# Patient Record
Sex: FEMALE | Race: BLACK OR AFRICAN AMERICAN | NOT HISPANIC OR LATINO
[De-identification: names, ages, dates, MRNs, and addresses within clinical notes are randomized per-mention and may not be internally consistent; named-entity substitution may affect disease eponyms.]

---

## 2018-03-22 ENCOUNTER — APPOINTMENT (OUTPATIENT)
Dept: ULTRASOUND IMAGING | Facility: IMAGING CENTER | Age: 28
End: 2018-03-22
Payer: MEDICAID

## 2018-03-22 ENCOUNTER — OUTPATIENT (OUTPATIENT)
Dept: OUTPATIENT SERVICES | Facility: HOSPITAL | Age: 28
LOS: 1 days | End: 2018-03-22
Payer: COMMERCIAL

## 2018-03-22 DIAGNOSIS — Z00.8 ENCOUNTER FOR OTHER GENERAL EXAMINATION: ICD-10-CM

## 2018-03-22 PROCEDURE — 76856 US EXAM PELVIC COMPLETE: CPT

## 2018-03-22 PROCEDURE — 76700 US EXAM ABDOM COMPLETE: CPT

## 2018-03-22 PROCEDURE — 76856 US EXAM PELVIC COMPLETE: CPT | Mod: 26

## 2018-03-22 PROCEDURE — 76830 TRANSVAGINAL US NON-OB: CPT | Mod: 26

## 2018-03-22 PROCEDURE — 76700 US EXAM ABDOM COMPLETE: CPT | Mod: 26

## 2018-03-22 PROCEDURE — 76830 TRANSVAGINAL US NON-OB: CPT

## 2018-09-06 ENCOUNTER — RESULT REVIEW (OUTPATIENT)
Age: 28
End: 2018-09-06

## 2018-09-06 ENCOUNTER — LABORATORY RESULT (OUTPATIENT)
Age: 28
End: 2018-09-06

## 2018-09-06 ENCOUNTER — OUTPATIENT (OUTPATIENT)
Dept: OUTPATIENT SERVICES | Facility: HOSPITAL | Age: 28
LOS: 1 days | End: 2018-09-06

## 2018-09-06 ENCOUNTER — APPOINTMENT (OUTPATIENT)
Dept: OBGYN | Facility: HOSPITAL | Age: 28
End: 2018-09-06
Payer: MEDICAID

## 2018-09-06 VITALS
BODY MASS INDEX: 40.57 KG/M2 | HEART RATE: 109 BPM | HEIGHT: 62 IN | WEIGHT: 220.46 LBS | DIASTOLIC BLOOD PRESSURE: 65 MMHG | SYSTOLIC BLOOD PRESSURE: 119 MMHG

## 2018-09-06 DIAGNOSIS — Z87.42 PERSONAL HISTORY OF OTHER DISEASES OF THE FEMALE GENITAL TRACT: ICD-10-CM

## 2018-09-06 DIAGNOSIS — Z78.9 OTHER SPECIFIED HEALTH STATUS: ICD-10-CM

## 2018-09-06 LAB — HCG SERPL-ACNC: 1347 MIU/ML — SIGNIFICANT CHANGE UP

## 2018-09-06 PROCEDURE — 81025 URINE PREGNANCY TEST: CPT | Mod: NC

## 2018-09-06 PROCEDURE — 99213 OFFICE O/P EST LOW 20 MIN: CPT | Mod: GE

## 2018-09-07 ENCOUNTER — MESSAGE (OUTPATIENT)
Age: 28
End: 2018-09-07

## 2018-09-07 DIAGNOSIS — Z32.01 ENCOUNTER FOR PREGNANCY TEST, RESULT POSITIVE: ICD-10-CM

## 2018-09-07 DIAGNOSIS — Z78.9 OTHER SPECIFIED HEALTH STATUS: ICD-10-CM

## 2018-09-07 DIAGNOSIS — Z87.42 PERSONAL HISTORY OF OTHER DISEASES OF THE FEMALE GENITAL TRACT: ICD-10-CM

## 2018-09-07 LAB — HPV HIGH+LOW RISK DNA PNL CVX: SIGNIFICANT CHANGE UP

## 2018-09-10 ENCOUNTER — LABORATORY RESULT (OUTPATIENT)
Age: 28
End: 2018-09-10

## 2018-09-10 ENCOUNTER — APPOINTMENT (OUTPATIENT)
Dept: OBGYN | Facility: HOSPITAL | Age: 28
End: 2018-09-10

## 2018-09-10 ENCOUNTER — OUTPATIENT (OUTPATIENT)
Dept: OUTPATIENT SERVICES | Facility: HOSPITAL | Age: 28
LOS: 1 days | End: 2018-09-10
Payer: MEDICAID

## 2018-09-10 LAB
CYTOLOGY SPEC DOC CYTO: SIGNIFICANT CHANGE UP
HCG SERPL-ACNC: 4907 MIU/ML — SIGNIFICANT CHANGE UP

## 2018-09-11 DIAGNOSIS — Z00.00 ENCOUNTER FOR GENERAL ADULT MEDICAL EXAMINATION WITHOUT ABNORMAL FINDINGS: ICD-10-CM

## 2018-09-11 LAB — HCG UR QL: POSITIVE

## 2018-09-12 LAB — CYTOLOGY CVX/VAG DOC THIN PREP: NORMAL

## 2018-09-13 ENCOUNTER — APPOINTMENT (OUTPATIENT)
Dept: ULTRASOUND IMAGING | Facility: HOSPITAL | Age: 28
End: 2018-09-13

## 2018-09-13 PROCEDURE — 76801 OB US < 14 WKS SINGLE FETUS: CPT | Mod: 26

## 2018-09-24 ENCOUNTER — EMERGENCY (EMERGENCY)
Facility: HOSPITAL | Age: 28
LOS: 1 days | Discharge: HOME | End: 2018-09-24
Attending: EMERGENCY MEDICINE | Admitting: EMERGENCY MEDICINE
Payer: MEDICAID

## 2018-09-24 VITALS
OXYGEN SATURATION: 100 % | RESPIRATION RATE: 18 BRPM | TEMPERATURE: 98 F | HEART RATE: 95 BPM | DIASTOLIC BLOOD PRESSURE: 64 MMHG | SYSTOLIC BLOOD PRESSURE: 109 MMHG

## 2018-09-24 LAB
APPEARANCE UR: CLEAR — SIGNIFICANT CHANGE UP
BACTERIA # UR AUTO: NEGATIVE — SIGNIFICANT CHANGE UP
BILIRUB UR-MCNC: NEGATIVE — SIGNIFICANT CHANGE UP
BLD GP AB SCN SERPL QL: NEGATIVE — SIGNIFICANT CHANGE UP
BLOOD UR QL VISUAL: SIGNIFICANT CHANGE UP
COLOR SPEC: YELLOW — SIGNIFICANT CHANGE UP
GLUCOSE UR-MCNC: NEGATIVE — SIGNIFICANT CHANGE UP
HCG SERPL-ACNC: SIGNIFICANT CHANGE UP MIU/ML
HYALINE CASTS # UR AUTO: NEGATIVE — SIGNIFICANT CHANGE UP
KETONES UR-MCNC: NEGATIVE — SIGNIFICANT CHANGE UP
LEUKOCYTE ESTERASE UR-ACNC: NEGATIVE — SIGNIFICANT CHANGE UP
NITRITE UR-MCNC: NEGATIVE — SIGNIFICANT CHANGE UP
PH UR: 7 — SIGNIFICANT CHANGE UP (ref 5–8)
PROT UR-MCNC: 10 — SIGNIFICANT CHANGE UP
RBC CASTS # UR COMP ASSIST: SIGNIFICANT CHANGE UP (ref 0–?)
RH IG SCN BLD-IMP: POSITIVE — SIGNIFICANT CHANGE UP
SP GR SPEC: 1.02 — SIGNIFICANT CHANGE UP (ref 1–1.04)
SQUAMOUS # UR AUTO: SIGNIFICANT CHANGE UP
UROBILINOGEN FLD QL: NORMAL — SIGNIFICANT CHANGE UP
WBC UR QL: SIGNIFICANT CHANGE UP (ref 0–?)

## 2018-09-24 PROCEDURE — 99284 EMERGENCY DEPT VISIT MOD MDM: CPT

## 2018-09-24 PROCEDURE — 76830 TRANSVAGINAL US NON-OB: CPT | Mod: 26

## 2018-09-24 NOTE — ED PROVIDER NOTE - MEDICAL DECISION MAKING DETAILS
suprapubic cramping and vaginal spotting. ~ 7-8 weeks pregnant. suprapubic cramping and vaginal spotting. ~ 7-8 weeks pregnant. RH +. US confirming IUP at 7 weeks. on patient re-eval she has no complaints. abd soft and NT. VS stable. eating/drinking without complaints. Patient was discharged with instructions to drink fluids , and follow up with obgyn in 1-2 days for repeat evaluation and further management.    The patient was discharged from the ED in stable condition. All results of today's workup were discussed with the patient and all questions/concerns were addressed. All discharge instructions were thoroughly discussed with the patient, as well as important warning signs and new/ worsening symptoms which should necessitate patient's immediate return to the ED. The patient is agreeable with discharge and expresses full understanding of all instructions given.

## 2018-09-24 NOTE — ED ADULT NURSE NOTE - OBJECTIVE STATEMENT
A&Ox3, respirations even and unlabored, skin warm and dry good for color, ambulatory with steady gait. Patient reports vaginal spotting and went to clinic and was directed to ER. Patient also reports lower abdominal cramping. Left hand 22g IV placed, labs drawn and sent.

## 2018-09-24 NOTE — ED PROVIDER NOTE - OBJECTIVE STATEMENT
27 yo F  approximately 8 weeks pregnant, unknown LMP, presents with suprapubic cramping and vaginal spotting since yesterday. mild cramping at this time. no vaginal discharge. had an US 2 weeks ago that just showed gestational sac with no fetal pole.   no f/ch, N/V/D, urinary complaints 27 yo F  approximately 7-8 weeks pregnant, unknown LMP, presents with suprapubic cramping and vaginal spotting since yesterday. mild cramping at this time. no vaginal discharge. had an US 2 weeks ago that just showed gestational sac with no fetal pole.   no f/ch, N/V/D, urinary complaints

## 2018-09-24 NOTE — ED PROVIDER NOTE - PMH
----- Message from Daniel Galeano MD sent at 4/6/2017  9:11 AM CDT -----  Regarding: RE:  I am fine with that but am not around today, is there someone who can prescribe her 20 norcos?  ----- Message -----     From: Marissa Cano LPN     Sent: 4/6/2017   8:59 AM       To: Daniel Galeano MD    Patient called still having pain at level 8 in her left side (flank area)  She saw you last week and you recommended her for an appt with pain  Well that is weeks out and she needs meds till then??? marissa   No pertinent past medical history

## 2018-09-24 NOTE — ED ADULT NURSE NOTE - NSIMPLEMENTINTERV_GEN_ALL_ED
Implemented All Universal Safety Interventions:  [place] to call system. Call bell, personal items and telephone within reach. Instruct patient to call for assistance. Room bathroom lighting operational. Non-slip footwear when patient is off stretcher. Physically safe environment: no spills, clutter or unnecessary equipment. Stretcher in lowest position, wheels locked, appropriate side rails in place.

## 2018-09-26 LAB
BACTERIA UR CULT: SIGNIFICANT CHANGE UP
SPECIMEN SOURCE: SIGNIFICANT CHANGE UP

## 2018-09-27 ENCOUNTER — APPOINTMENT (OUTPATIENT)
Dept: ULTRASOUND IMAGING | Facility: HOSPITAL | Age: 28
End: 2018-09-27

## 2018-09-28 NOTE — ED POST DISCHARGE NOTE - RESULT SUMMARY
UCX: (+) contamination. Pt currently pregnant. I spoke to her. Denies dysuria, burning, frequency. I advised her to have urine culture repeated with PMD or OBGYN. Pt understands and agrees. Strict return precautions were discussed.

## 2018-10-09 ENCOUNTER — LABORATORY RESULT (OUTPATIENT)
Age: 28
End: 2018-10-09

## 2018-10-09 ENCOUNTER — APPOINTMENT (OUTPATIENT)
Dept: OBGYN | Facility: HOSPITAL | Age: 28
End: 2018-10-09

## 2018-10-09 ENCOUNTER — OUTPATIENT (OUTPATIENT)
Dept: OUTPATIENT SERVICES | Facility: HOSPITAL | Age: 28
LOS: 1 days | End: 2018-10-09
Payer: MEDICAID

## 2018-10-09 ENCOUNTER — NON-APPOINTMENT (OUTPATIENT)
Age: 28
End: 2018-10-09

## 2018-10-09 VITALS
DIASTOLIC BLOOD PRESSURE: 81 MMHG | HEART RATE: 96 BPM | WEIGHT: 227.73 LBS | SYSTOLIC BLOOD PRESSURE: 112 MMHG | BODY MASS INDEX: 41.65 KG/M2

## 2018-10-09 DIAGNOSIS — Z87.898 PERSONAL HISTORY OF OTHER SPECIFIED CONDITIONS: ICD-10-CM

## 2018-10-09 DIAGNOSIS — Z34.90 ENCOUNTER FOR SUPERVISION OF NORMAL PREGNANCY, UNSPECIFIED, UNSPECIFIED TRIMESTER: ICD-10-CM

## 2018-10-09 DIAGNOSIS — Z82.49 FAMILY HISTORY OF ISCHEMIC HEART DISEASE AND OTHER DISEASES OF THE CIRCULATORY SYSTEM: ICD-10-CM

## 2018-10-09 DIAGNOSIS — Z82.0 FAMILY HISTORY OF EPILEPSY AND OTHER DISEASES OF THE NERVOUS SYSTEM: ICD-10-CM

## 2018-10-09 LAB
ALBUMIN SERPL ELPH-MCNC: 4.2 G/DL — SIGNIFICANT CHANGE UP (ref 3.3–5)
ALP SERPL-CCNC: 65 U/L — SIGNIFICANT CHANGE UP (ref 40–120)
ALT FLD-CCNC: 18 U/L — SIGNIFICANT CHANGE UP (ref 4–33)
APPEARANCE UR: CLEAR — SIGNIFICANT CHANGE UP
AST SERPL-CCNC: 14 U/L — SIGNIFICANT CHANGE UP (ref 4–32)
BACTERIA # UR AUTO: NEGATIVE — SIGNIFICANT CHANGE UP
BASOPHILS # BLD AUTO: 0.03 K/UL — SIGNIFICANT CHANGE UP (ref 0–0.2)
BASOPHILS NFR BLD AUTO: 0.3 % — SIGNIFICANT CHANGE UP (ref 0–2)
BILIRUB SERPL-MCNC: 0.3 MG/DL — SIGNIFICANT CHANGE UP (ref 0.2–1.2)
BILIRUB UR-MCNC: NEGATIVE — SIGNIFICANT CHANGE UP
BLD GP AB SCN SERPL QL: NEGATIVE — SIGNIFICANT CHANGE UP
BLOOD UR QL VISUAL: NEGATIVE — SIGNIFICANT CHANGE UP
BUN SERPL-MCNC: 5 MG/DL — LOW (ref 7–23)
CALCIUM SERPL-MCNC: 9.4 MG/DL — SIGNIFICANT CHANGE UP (ref 8.4–10.5)
CHLORIDE SERPL-SCNC: 99 MMOL/L — SIGNIFICANT CHANGE UP (ref 98–107)
CO2 SERPL-SCNC: 25 MMOL/L — SIGNIFICANT CHANGE UP (ref 22–31)
COLOR SPEC: YELLOW — SIGNIFICANT CHANGE UP
CREAT SERPL-MCNC: 0.68 MG/DL — SIGNIFICANT CHANGE UP (ref 0.5–1.3)
EOSINOPHIL # BLD AUTO: 0.04 K/UL — SIGNIFICANT CHANGE UP (ref 0–0.5)
EOSINOPHIL NFR BLD AUTO: 0.4 % — SIGNIFICANT CHANGE UP (ref 0–6)
GLUCOSE SERPL-MCNC: 81 MG/DL — SIGNIFICANT CHANGE UP (ref 70–99)
GLUCOSE UR-MCNC: NEGATIVE — SIGNIFICANT CHANGE UP
HBA1C BLD-MCNC: 4.7 % — SIGNIFICANT CHANGE UP (ref 4–5.6)
HBV SURFACE AG SER-ACNC: NONREACTIVE — SIGNIFICANT CHANGE UP
HCT VFR BLD CALC: 36.9 % — SIGNIFICANT CHANGE UP (ref 34.5–45)
HCV AB S/CO SERPL IA: 0.1 S/CO — SIGNIFICANT CHANGE UP
HCV AB SERPL-IMP: SIGNIFICANT CHANGE UP
HGB BLD-MCNC: 12.2 G/DL — SIGNIFICANT CHANGE UP (ref 11.5–15.5)
HIV 1+2 AB+HIV1 P24 AG SERPL QL IA: SIGNIFICANT CHANGE UP
HYALINE CASTS # UR AUTO: SIGNIFICANT CHANGE UP
IMM GRANULOCYTES # BLD AUTO: 0.05 # — SIGNIFICANT CHANGE UP
IMM GRANULOCYTES NFR BLD AUTO: 0.5 % — SIGNIFICANT CHANGE UP (ref 0–1.5)
KETONES UR-MCNC: NEGATIVE — SIGNIFICANT CHANGE UP
LEUKOCYTE ESTERASE UR-ACNC: NEGATIVE — SIGNIFICANT CHANGE UP
LYMPHOCYTES # BLD AUTO: 2.47 K/UL — SIGNIFICANT CHANGE UP (ref 1–3.3)
LYMPHOCYTES # BLD AUTO: 24.4 % — SIGNIFICANT CHANGE UP (ref 13–44)
MCHC RBC-ENTMCNC: 31.1 PG — SIGNIFICANT CHANGE UP (ref 27–34)
MCHC RBC-ENTMCNC: 33.1 % — SIGNIFICANT CHANGE UP (ref 32–36)
MCV RBC AUTO: 94.1 FL — SIGNIFICANT CHANGE UP (ref 80–100)
MONOCYTES # BLD AUTO: 0.54 K/UL — SIGNIFICANT CHANGE UP (ref 0–0.9)
MONOCYTES NFR BLD AUTO: 5.3 % — SIGNIFICANT CHANGE UP (ref 2–14)
NEUTROPHILS # BLD AUTO: 6.99 K/UL — SIGNIFICANT CHANGE UP (ref 1.8–7.4)
NEUTROPHILS NFR BLD AUTO: 69.1 % — SIGNIFICANT CHANGE UP (ref 43–77)
NITRITE UR-MCNC: NEGATIVE — SIGNIFICANT CHANGE UP
NRBC # FLD: 0 — SIGNIFICANT CHANGE UP
PH UR: 6 — SIGNIFICANT CHANGE UP (ref 5–8)
PLATELET # BLD AUTO: 310 K/UL — SIGNIFICANT CHANGE UP (ref 150–400)
PMV BLD: 9.6 FL — SIGNIFICANT CHANGE UP (ref 7–13)
POTASSIUM SERPL-MCNC: 3.8 MMOL/L — SIGNIFICANT CHANGE UP (ref 3.5–5.3)
POTASSIUM SERPL-SCNC: 3.8 MMOL/L — SIGNIFICANT CHANGE UP (ref 3.5–5.3)
PROT SERPL-MCNC: 7.4 G/DL — SIGNIFICANT CHANGE UP (ref 6–8.3)
PROT UR-MCNC: 30 — SIGNIFICANT CHANGE UP
RBC # BLD: 3.92 M/UL — SIGNIFICANT CHANGE UP (ref 3.8–5.2)
RBC # FLD: 12.9 % — SIGNIFICANT CHANGE UP (ref 10.3–14.5)
RBC CASTS # UR COMP ASSIST: SIGNIFICANT CHANGE UP (ref 0–?)
RH IG SCN BLD-IMP: POSITIVE — SIGNIFICANT CHANGE UP
RUBV IGG SER-ACNC: 4.2 INDEX — SIGNIFICANT CHANGE UP
RUBV IGG SER-IMP: POSITIVE — SIGNIFICANT CHANGE UP
SODIUM SERPL-SCNC: 135 MMOL/L — SIGNIFICANT CHANGE UP (ref 135–145)
SP GR SPEC: 1.03 — SIGNIFICANT CHANGE UP (ref 1–1.04)
SQUAMOUS # UR AUTO: SIGNIFICANT CHANGE UP
T PALLIDUM AB TITR SER: NEGATIVE — SIGNIFICANT CHANGE UP
URATE SERPL-MCNC: 3.1 MG/DL — SIGNIFICANT CHANGE UP (ref 2.5–7)
UROBILINOGEN FLD QL: NORMAL — SIGNIFICANT CHANGE UP
VZV IGG FLD QL IA: 65.4 INDEX — SIGNIFICANT CHANGE UP
VZV IGG FLD QL IA: NEGATIVE — SIGNIFICANT CHANGE UP
WBC # BLD: 10.12 K/UL — SIGNIFICANT CHANGE UP (ref 3.8–10.5)
WBC # FLD AUTO: 10.12 K/UL — SIGNIFICANT CHANGE UP (ref 3.8–10.5)
WBC UR QL: SIGNIFICANT CHANGE UP (ref 0–?)

## 2018-10-09 PROCEDURE — G0452: CPT

## 2018-10-10 LAB
C TRACH RRNA SPEC QL NAA+PROBE: SIGNIFICANT CHANGE UP
GAMMA INTERFERON BACKGROUND BLD IA-ACNC: 0.45 IU/ML — SIGNIFICANT CHANGE UP
HGB A MFR BLD: 96.5 % — SIGNIFICANT CHANGE UP
HGB A2 MFR BLD: 3.2 % — SIGNIFICANT CHANGE UP (ref 2.4–3.5)
HGB ELECT COMMENT: SIGNIFICANT CHANGE UP
HGB F MFR BLD: < 1 % — SIGNIFICANT CHANGE UP (ref 0–1.5)
LEAD SERPL-MCNC: 1 UG/DL — SIGNIFICANT CHANGE UP (ref 0–4)
M TB IFN-G BLD-IMP: NEGATIVE — SIGNIFICANT CHANGE UP
M TB IFN-G CD4+ BCKGRND COR BLD-ACNC: -0.19 IU/ML — SIGNIFICANT CHANGE UP
M TB IFN-G CD4+CD8+ BCKGRND COR BLD-ACNC: -0.16 IU/ML — SIGNIFICANT CHANGE UP
N GONORRHOEA RRNA SPEC QL NAA+PROBE: SIGNIFICANT CHANGE UP
QUANT TB PLUS MITOGEN MINUS NIL: 7.73 IU/ML — SIGNIFICANT CHANGE UP
SPECIMEN SOURCE: SIGNIFICANT CHANGE UP

## 2018-10-11 LAB
BACTERIA UR CULT: SIGNIFICANT CHANGE UP
SPECIMEN SOURCE: SIGNIFICANT CHANGE UP

## 2018-10-14 LAB — CFTR MUT ANL BLD/T: NEGATIVE — SIGNIFICANT CHANGE UP

## 2018-10-22 ENCOUNTER — OUTPATIENT (OUTPATIENT)
Dept: OUTPATIENT SERVICES | Facility: HOSPITAL | Age: 28
LOS: 1 days | End: 2018-10-22

## 2018-10-22 ENCOUNTER — NON-APPOINTMENT (OUTPATIENT)
Age: 28
End: 2018-10-22

## 2018-10-22 ENCOUNTER — APPOINTMENT (OUTPATIENT)
Dept: OBGYN | Facility: HOSPITAL | Age: 28
End: 2018-10-22

## 2018-10-22 VITALS
HEART RATE: 90 BPM | BODY MASS INDEX: 41.88 KG/M2 | SYSTOLIC BLOOD PRESSURE: 128 MMHG | DIASTOLIC BLOOD PRESSURE: 75 MMHG | WEIGHT: 229 LBS

## 2018-10-23 DIAGNOSIS — Z34.90 ENCOUNTER FOR SUPERVISION OF NORMAL PREGNANCY, UNSPECIFIED, UNSPECIFIED TRIMESTER: ICD-10-CM

## 2018-11-07 ENCOUNTER — APPOINTMENT (OUTPATIENT)
Dept: ANTEPARTUM | Facility: CLINIC | Age: 28
End: 2018-11-07
Payer: MEDICAID

## 2018-11-07 ENCOUNTER — ASOB RESULT (OUTPATIENT)
Age: 28
End: 2018-11-07

## 2018-11-07 ENCOUNTER — LABORATORY RESULT (OUTPATIENT)
Age: 28
End: 2018-11-07

## 2018-11-07 PROCEDURE — 76813 OB US NUCHAL MEAS 1 GEST: CPT | Mod: 26

## 2018-11-12 ENCOUNTER — APPOINTMENT (OUTPATIENT)
Dept: OBGYN | Facility: HOSPITAL | Age: 28
End: 2018-11-12

## 2018-11-12 LAB
1ST TRIMESTER DATA: SIGNIFICANT CHANGE UP
ADDENDUM DOC: SIGNIFICANT CHANGE UP
AFP SERPL-ACNC: SIGNIFICANT CHANGE UP
B-HCG FREE SERPL-MCNC: SIGNIFICANT CHANGE UP
CLINICAL BIOCHEMIST REVIEW: SIGNIFICANT CHANGE UP
CLINICAL BIOCHEMIST REVIEW: SIGNIFICANT CHANGE UP
DEMOGRAPHIC DATA: SIGNIFICANT CHANGE UP
NT: SIGNIFICANT CHANGE UP
PAPP-A SERPL-ACNC: SIGNIFICANT CHANGE UP
SCREEN-FOOTER: SIGNIFICANT CHANGE UP
SCREEN-RECOMMENDATIONS: SIGNIFICANT CHANGE UP

## 2018-11-19 ENCOUNTER — OUTPATIENT (OUTPATIENT)
Dept: OUTPATIENT SERVICES | Facility: HOSPITAL | Age: 28
LOS: 1 days | End: 2018-11-19
Payer: MEDICAID

## 2018-11-19 ENCOUNTER — LABORATORY RESULT (OUTPATIENT)
Age: 28
End: 2018-11-19

## 2018-11-19 ENCOUNTER — APPOINTMENT (OUTPATIENT)
Dept: OBGYN | Facility: HOSPITAL | Age: 28
End: 2018-11-19
Payer: MEDICAID

## 2018-11-19 VITALS
BODY MASS INDEX: 42.51 KG/M2 | WEIGHT: 231 LBS | HEART RATE: 97 BPM | HEIGHT: 62 IN | SYSTOLIC BLOOD PRESSURE: 122 MMHG | DIASTOLIC BLOOD PRESSURE: 62 MMHG

## 2018-11-19 DIAGNOSIS — Z34.92 ENCOUNTER FOR SUPERVISION OF NORMAL PREGNANCY, UNSPECIFIED, SECOND TRIMESTER: ICD-10-CM

## 2018-11-19 LAB — GLUCOSE BLDC GLUCOMTR-MCNC: 100

## 2018-11-19 PROCEDURE — 0502F SUBSEQUENT PRENATAL CARE: CPT | Mod: NC,GC

## 2018-11-20 ENCOUNTER — NON-APPOINTMENT (OUTPATIENT)
Age: 28
End: 2018-11-20

## 2018-11-20 LAB — AFP-TM SERPL-MCNC: 27.5 NG/ML — HIGH

## 2018-11-23 ENCOUNTER — NON-APPOINTMENT (OUTPATIENT)
Age: 28
End: 2018-11-23

## 2018-12-18 ENCOUNTER — APPOINTMENT (OUTPATIENT)
Dept: ULTRASOUND IMAGING | Facility: HOSPITAL | Age: 28
End: 2018-12-18

## 2018-12-18 PROCEDURE — 76805 OB US >/= 14 WKS SNGL FETUS: CPT | Mod: 26

## 2019-01-02 ENCOUNTER — OUTPATIENT (OUTPATIENT)
Dept: OUTPATIENT SERVICES | Facility: HOSPITAL | Age: 29
LOS: 1 days | End: 2019-01-02

## 2019-01-02 ENCOUNTER — APPOINTMENT (OUTPATIENT)
Dept: OBGYN | Facility: HOSPITAL | Age: 29
End: 2019-01-02

## 2019-01-02 ENCOUNTER — NON-APPOINTMENT (OUTPATIENT)
Age: 29
End: 2019-01-02

## 2019-01-02 ENCOUNTER — LABORATORY RESULT (OUTPATIENT)
Age: 29
End: 2019-01-02

## 2019-01-02 VITALS
SYSTOLIC BLOOD PRESSURE: 124 MMHG | BODY MASS INDEX: 43.06 KG/M2 | WEIGHT: 234 LBS | HEIGHT: 62 IN | DIASTOLIC BLOOD PRESSURE: 70 MMHG | HEART RATE: 100 BPM

## 2019-01-03 DIAGNOSIS — Z34.92 ENCOUNTER FOR SUPERVISION OF NORMAL PREGNANCY, UNSPECIFIED, SECOND TRIMESTER: ICD-10-CM

## 2019-01-04 LAB
1ST TRIMESTER DATA: SIGNIFICANT CHANGE UP
2ND TRIMESTER DATA: SIGNIFICANT CHANGE UP
AFP SERPL-ACNC: SIGNIFICANT CHANGE UP
AFP SERPL-ACNC: SIGNIFICANT CHANGE UP
B-HCG FREE SERPL-MCNC: SIGNIFICANT CHANGE UP
B-HCG FREE SERPL-MCNC: SIGNIFICANT CHANGE UP
CLINICAL BIOCHEMIST REVIEW: SIGNIFICANT CHANGE UP
DEMOGRAPHIC DATA: SIGNIFICANT CHANGE UP
INHIBIN A SERPL-MCNC: SIGNIFICANT CHANGE UP
NT: SIGNIFICANT CHANGE UP
PAPP-A SERPL-ACNC: SIGNIFICANT CHANGE UP
SCREEN-FOOTER: SIGNIFICANT CHANGE UP
U ESTRIOL SERPL-SCNC: SIGNIFICANT CHANGE UP

## 2019-01-31 ENCOUNTER — OUTPATIENT (OUTPATIENT)
Dept: OUTPATIENT SERVICES | Facility: HOSPITAL | Age: 29
LOS: 1 days | End: 2019-01-31
Payer: MEDICAID

## 2019-01-31 ENCOUNTER — APPOINTMENT (OUTPATIENT)
Dept: OBGYN | Facility: HOSPITAL | Age: 29
End: 2019-01-31

## 2019-01-31 ENCOUNTER — NON-APPOINTMENT (OUTPATIENT)
Age: 29
End: 2019-01-31

## 2019-01-31 ENCOUNTER — OUTPATIENT (OUTPATIENT)
Dept: OUTPATIENT SERVICES | Facility: HOSPITAL | Age: 29
LOS: 1 days | End: 2019-01-31

## 2019-01-31 VITALS
SYSTOLIC BLOOD PRESSURE: 119 MMHG | BODY MASS INDEX: 45.15 KG/M2 | HEIGHT: 62 IN | WEIGHT: 245.37 LBS | HEART RATE: 120 BPM | DIASTOLIC BLOOD PRESSURE: 79 MMHG

## 2019-01-31 VITALS — DIASTOLIC BLOOD PRESSURE: 61 MMHG | HEART RATE: 116 BPM | SYSTOLIC BLOOD PRESSURE: 128 MMHG

## 2019-01-31 DIAGNOSIS — Z3A.00 WEEKS OF GESTATION OF PREGNANCY NOT SPECIFIED: ICD-10-CM

## 2019-01-31 DIAGNOSIS — O26.899 OTHER SPECIFIED PREGNANCY RELATED CONDITIONS, UNSPECIFIED TRIMESTER: ICD-10-CM

## 2019-01-31 PROCEDURE — 76815 OB US LIMITED FETUS(S): CPT | Mod: 26

## 2019-01-31 PROCEDURE — 99213 OFFICE O/P EST LOW 20 MIN: CPT | Mod: 25

## 2019-02-01 ENCOUNTER — LABORATORY RESULT (OUTPATIENT)
Age: 29
End: 2019-02-01

## 2019-02-01 ENCOUNTER — APPOINTMENT (OUTPATIENT)
Dept: OBGYN | Facility: HOSPITAL | Age: 29
End: 2019-02-01

## 2019-02-01 ENCOUNTER — OUTPATIENT (OUTPATIENT)
Dept: OUTPATIENT SERVICES | Facility: HOSPITAL | Age: 29
LOS: 1 days | End: 2019-02-01

## 2019-02-01 VITALS
WEIGHT: 240 LBS | HEIGHT: 62 IN | DIASTOLIC BLOOD PRESSURE: 59 MMHG | SYSTOLIC BLOOD PRESSURE: 121 MMHG | HEART RATE: 116 BPM | BODY MASS INDEX: 44.16 KG/M2

## 2019-02-01 DIAGNOSIS — Z34.92 ENCOUNTER FOR SUPERVISION OF NORMAL PREGNANCY, UNSPECIFIED, SECOND TRIMESTER: ICD-10-CM

## 2019-02-01 LAB — GLUCOSE 1H P CHAL SERPL-SCNC: 143 MG/DL — SIGNIFICANT CHANGE UP (ref 120–170)

## 2019-02-04 ENCOUNTER — CHART COPY (OUTPATIENT)
Age: 29
End: 2019-02-04

## 2019-02-04 DIAGNOSIS — Z34.92 ENCOUNTER FOR SUPERVISION OF NORMAL PREGNANCY, UNSPECIFIED, SECOND TRIMESTER: ICD-10-CM

## 2019-02-05 ENCOUNTER — LABORATORY RESULT (OUTPATIENT)
Age: 29
End: 2019-02-05

## 2019-02-05 ENCOUNTER — APPOINTMENT (OUTPATIENT)
Dept: OBGYN | Facility: HOSPITAL | Age: 29
End: 2019-02-05

## 2019-02-05 ENCOUNTER — OUTPATIENT (OUTPATIENT)
Dept: OUTPATIENT SERVICES | Facility: HOSPITAL | Age: 29
LOS: 1 days | End: 2019-02-05

## 2019-02-05 LAB
GLUCOSE 1H P CHAL SERPL-SCNC: 173 MG/DL — HIGH (ref 120–170)
GLUCOSE 2H P GLC SERPL-MCNC: 170 MG/DL — HIGH (ref 70–120)
GLUCOSE 3H P CHAL SERPL-SCNC: 134 MG/DL — HIGH (ref 70–115)
GLUCOSE P FAST SERPL-MCNC: 109 MG/DL — HIGH (ref 70–108)

## 2019-02-11 ENCOUNTER — APPOINTMENT (OUTPATIENT)
Dept: OBGYN | Facility: HOSPITAL | Age: 29
End: 2019-02-11

## 2019-02-14 ENCOUNTER — NON-APPOINTMENT (OUTPATIENT)
Age: 29
End: 2019-02-14

## 2019-02-14 ENCOUNTER — OUTPATIENT (OUTPATIENT)
Dept: OUTPATIENT SERVICES | Facility: HOSPITAL | Age: 29
LOS: 1 days | End: 2019-02-14

## 2019-02-14 ENCOUNTER — APPOINTMENT (OUTPATIENT)
Dept: OBGYN | Facility: HOSPITAL | Age: 29
End: 2019-02-14

## 2019-02-14 VITALS
BODY MASS INDEX: 44.53 KG/M2 | HEIGHT: 62 IN | SYSTOLIC BLOOD PRESSURE: 116 MMHG | WEIGHT: 242 LBS | DIASTOLIC BLOOD PRESSURE: 78 MMHG | HEART RATE: 110 BPM

## 2019-02-15 DIAGNOSIS — O24.419 GESTATIONAL DIABETES MELLITUS IN PREGNANCY, UNSPECIFIED CONTROL: ICD-10-CM

## 2019-02-15 DIAGNOSIS — Z13.1 ENCOUNTER FOR SCREENING FOR DIABETES MELLITUS: ICD-10-CM

## 2019-02-15 DIAGNOSIS — Z34.92 ENCOUNTER FOR SUPERVISION OF NORMAL PREGNANCY, UNSPECIFIED, SECOND TRIMESTER: ICD-10-CM

## 2019-02-19 ENCOUNTER — OUTPATIENT (OUTPATIENT)
Dept: OUTPATIENT SERVICES | Facility: HOSPITAL | Age: 29
LOS: 1 days | End: 2019-02-19

## 2019-02-19 ENCOUNTER — APPOINTMENT (OUTPATIENT)
Dept: OBGYN | Facility: HOSPITAL | Age: 29
End: 2019-02-19
Payer: MEDICAID

## 2019-02-19 ENCOUNTER — NON-APPOINTMENT (OUTPATIENT)
Age: 29
End: 2019-02-19

## 2019-02-19 VITALS
DIASTOLIC BLOOD PRESSURE: 54 MMHG | HEART RATE: 82 BPM | BODY MASS INDEX: 44.35 KG/M2 | WEIGHT: 242.5 LBS | SYSTOLIC BLOOD PRESSURE: 120 MMHG

## 2019-02-19 PROCEDURE — 99213 OFFICE O/P EST LOW 20 MIN: CPT | Mod: GC,25

## 2019-02-19 PROCEDURE — 81003 URINALYSIS AUTO W/O SCOPE: CPT | Mod: NC,GC,QW

## 2019-02-19 RX ORDER — BLOOD SUGAR DIAGNOSTIC
STRIP MISCELLANEOUS
Qty: 2 | Refills: 3 | Status: DISCONTINUED | COMMUNITY
Start: 2019-02-11 | End: 2019-02-19

## 2019-02-19 RX ORDER — LANCETS
EACH MISCELLANEOUS
Qty: 2 | Refills: 2 | Status: DISCONTINUED | COMMUNITY
Start: 2019-02-11 | End: 2019-02-19

## 2019-02-19 RX ORDER — BLOOD-GLUCOSE METER
EACH MISCELLANEOUS
Qty: 1 | Refills: 0 | Status: DISCONTINUED | COMMUNITY
Start: 2019-02-11 | End: 2019-02-19

## 2019-02-19 RX ORDER — ISOPROPYL ALCOHOL 70 ML/100ML
SWAB TOPICAL
Qty: 1 | Refills: 1 | Status: DISCONTINUED | COMMUNITY
Start: 2019-02-14 | End: 2019-02-19

## 2019-02-19 RX ORDER — BLOOD SUGAR DIAGNOSTIC
STRIP MISCELLANEOUS
Qty: 200 | Refills: 3 | Status: DISCONTINUED | COMMUNITY
Start: 2019-02-14 | End: 2019-02-19

## 2019-02-20 ENCOUNTER — NON-APPOINTMENT (OUTPATIENT)
Age: 29
End: 2019-02-20

## 2019-02-20 DIAGNOSIS — O24.419 GESTATIONAL DIABETES MELLITUS IN PREGNANCY, UNSPECIFIED CONTROL: ICD-10-CM

## 2019-02-20 DIAGNOSIS — O09.90 SUPERVISION OF HIGH RISK PREGNANCY, UNSPECIFIED, UNSPECIFIED TRIMESTER: ICD-10-CM

## 2019-02-25 ENCOUNTER — APPOINTMENT (OUTPATIENT)
Dept: OBGYN | Facility: HOSPITAL | Age: 29
End: 2019-02-25

## 2019-02-26 ENCOUNTER — APPOINTMENT (OUTPATIENT)
Dept: ANTEPARTUM | Facility: CLINIC | Age: 29
End: 2019-02-26
Payer: MEDICAID

## 2019-02-26 ENCOUNTER — ASOB RESULT (OUTPATIENT)
Age: 29
End: 2019-02-26

## 2019-02-26 PROCEDURE — 76819 FETAL BIOPHYS PROFIL W/O NST: CPT | Mod: 26

## 2019-02-26 PROCEDURE — 76811 OB US DETAILED SNGL FETUS: CPT | Mod: 26

## 2019-03-04 ENCOUNTER — APPOINTMENT (OUTPATIENT)
Dept: OBGYN | Facility: HOSPITAL | Age: 29
End: 2019-03-04

## 2019-03-11 ENCOUNTER — APPOINTMENT (OUTPATIENT)
Dept: OBGYN | Facility: HOSPITAL | Age: 29
End: 2019-03-11
Payer: MEDICAID

## 2019-03-11 ENCOUNTER — OUTPATIENT (OUTPATIENT)
Dept: OUTPATIENT SERVICES | Facility: HOSPITAL | Age: 29
LOS: 1 days | End: 2019-03-11

## 2019-03-11 ENCOUNTER — NON-APPOINTMENT (OUTPATIENT)
Age: 29
End: 2019-03-11

## 2019-03-11 VITALS
DIASTOLIC BLOOD PRESSURE: 75 MMHG | SYSTOLIC BLOOD PRESSURE: 117 MMHG | HEIGHT: 62 IN | WEIGHT: 242 LBS | BODY MASS INDEX: 44.53 KG/M2 | HEART RATE: 87 BPM

## 2019-03-11 DIAGNOSIS — O09.93 SUPERVISION OF HIGH RISK PREGNANCY, UNSPECIFIED, THIRD TRIMESTER: ICD-10-CM

## 2019-03-11 PROCEDURE — 99213 OFFICE O/P EST LOW 20 MIN: CPT | Mod: 25,GC

## 2019-03-12 DIAGNOSIS — O24.419 GESTATIONAL DIABETES MELLITUS IN PREGNANCY, UNSPECIFIED CONTROL: ICD-10-CM

## 2019-03-14 ENCOUNTER — NON-APPOINTMENT (OUTPATIENT)
Age: 29
End: 2019-03-14

## 2019-03-18 ENCOUNTER — NON-APPOINTMENT (OUTPATIENT)
Age: 29
End: 2019-03-18

## 2019-03-18 ENCOUNTER — OUTPATIENT (OUTPATIENT)
Dept: OUTPATIENT SERVICES | Facility: HOSPITAL | Age: 29
LOS: 1 days | End: 2019-03-18

## 2019-03-18 ENCOUNTER — APPOINTMENT (OUTPATIENT)
Dept: OBGYN | Facility: HOSPITAL | Age: 29
End: 2019-03-18
Payer: MEDICAID

## 2019-03-18 VITALS
BODY MASS INDEX: 44.53 KG/M2 | DIASTOLIC BLOOD PRESSURE: 76 MMHG | SYSTOLIC BLOOD PRESSURE: 130 MMHG | HEIGHT: 62 IN | HEART RATE: 90 BPM | WEIGHT: 242 LBS

## 2019-03-18 LAB
GLUCOSE BLDC GLUCOMTR-MCNC: 106
GLUCOSE BLDC GLUCOMTR-MCNC: 106 MG/DL — HIGH (ref 70–99)

## 2019-03-18 PROCEDURE — 99213 OFFICE O/P EST LOW 20 MIN: CPT | Mod: GC,25

## 2019-03-19 DIAGNOSIS — O24.419 GESTATIONAL DIABETES MELLITUS IN PREGNANCY, UNSPECIFIED CONTROL: ICD-10-CM

## 2019-03-19 DIAGNOSIS — O09.93 SUPERVISION OF HIGH RISK PREGNANCY, UNSPECIFIED, THIRD TRIMESTER: ICD-10-CM

## 2019-03-21 ENCOUNTER — NON-APPOINTMENT (OUTPATIENT)
Age: 29
End: 2019-03-21

## 2019-03-25 ENCOUNTER — APPOINTMENT (OUTPATIENT)
Dept: OBGYN | Facility: HOSPITAL | Age: 29
End: 2019-03-25

## 2019-03-26 ENCOUNTER — ASOB RESULT (OUTPATIENT)
Age: 29
End: 2019-03-26

## 2019-03-26 ENCOUNTER — NON-APPOINTMENT (OUTPATIENT)
Age: 29
End: 2019-03-26

## 2019-03-26 ENCOUNTER — APPOINTMENT (OUTPATIENT)
Dept: OBGYN | Facility: HOSPITAL | Age: 29
End: 2019-03-26

## 2019-03-26 ENCOUNTER — RESULT CHARGE (OUTPATIENT)
Age: 29
End: 2019-03-26

## 2019-03-26 ENCOUNTER — OUTPATIENT (OUTPATIENT)
Dept: OUTPATIENT SERVICES | Facility: HOSPITAL | Age: 29
LOS: 1 days | End: 2019-03-26

## 2019-03-26 ENCOUNTER — OTHER (OUTPATIENT)
Age: 29
End: 2019-03-26

## 2019-03-26 ENCOUNTER — APPOINTMENT (OUTPATIENT)
Dept: ANTEPARTUM | Facility: CLINIC | Age: 29
End: 2019-03-26
Payer: MEDICAID

## 2019-03-26 VITALS
HEART RATE: 102 BPM | DIASTOLIC BLOOD PRESSURE: 70 MMHG | WEIGHT: 243 LBS | SYSTOLIC BLOOD PRESSURE: 122 MMHG | BODY MASS INDEX: 44.72 KG/M2 | HEIGHT: 62 IN

## 2019-03-26 LAB — GLUCOSE BLDC GLUCOMTR-MCNC: 114

## 2019-03-26 PROCEDURE — 76816 OB US FOLLOW-UP PER FETUS: CPT | Mod: 26

## 2019-03-26 PROCEDURE — 76819 FETAL BIOPHYS PROFIL W/O NST: CPT | Mod: 26

## 2019-03-27 DIAGNOSIS — O24.419 GESTATIONAL DIABETES MELLITUS IN PREGNANCY, UNSPECIFIED CONTROL: ICD-10-CM

## 2019-03-27 DIAGNOSIS — Z34.92 ENCOUNTER FOR SUPERVISION OF NORMAL PREGNANCY, UNSPECIFIED, SECOND TRIMESTER: ICD-10-CM

## 2019-03-27 RX ORDER — INSULIN ASPART 100 [IU]/ML
100 INJECTION, SOLUTION INTRAVENOUS; SUBCUTANEOUS
Qty: 3 | Refills: 0 | Status: DISCONTINUED | COMMUNITY
Start: 2019-03-18 | End: 2019-03-27

## 2019-03-27 RX ORDER — INSULIN DETEMIR 100 [IU]/ML
100 INJECTION, SOLUTION SUBCUTANEOUS
Qty: 3 | Refills: 0 | Status: DISCONTINUED | COMMUNITY
Start: 2019-02-19 | End: 2019-03-27

## 2019-04-01 ENCOUNTER — OUTPATIENT (OUTPATIENT)
Dept: OUTPATIENT SERVICES | Facility: HOSPITAL | Age: 29
LOS: 1 days | End: 2019-04-01

## 2019-04-01 ENCOUNTER — APPOINTMENT (OUTPATIENT)
Dept: OBGYN | Facility: HOSPITAL | Age: 29
End: 2019-04-01
Payer: MEDICAID

## 2019-04-01 VITALS
SYSTOLIC BLOOD PRESSURE: 125 MMHG | WEIGHT: 243 LBS | DIASTOLIC BLOOD PRESSURE: 75 MMHG | HEART RATE: 80 BPM | BODY MASS INDEX: 44.72 KG/M2 | HEIGHT: 62 IN

## 2019-04-01 DIAGNOSIS — O24.419 GESTATIONAL DIABETES MELLITUS IN PREGNANCY, UNSPECIFIED CONTROL: ICD-10-CM

## 2019-04-01 LAB — GLUCOSE BLDC GLUCOMTR-MCNC: 146

## 2019-04-01 PROCEDURE — 99213 OFFICE O/P EST LOW 20 MIN: CPT | Mod: GC,25

## 2019-04-01 PROCEDURE — 81003 URINALYSIS AUTO W/O SCOPE: CPT | Mod: NC,GC,QW

## 2019-04-02 DIAGNOSIS — O09.93 SUPERVISION OF HIGH RISK PREGNANCY, UNSPECIFIED, THIRD TRIMESTER: ICD-10-CM

## 2019-04-03 ENCOUNTER — NON-APPOINTMENT (OUTPATIENT)
Age: 29
End: 2019-04-03

## 2019-04-08 ENCOUNTER — LABORATORY RESULT (OUTPATIENT)
Age: 29
End: 2019-04-08

## 2019-04-08 ENCOUNTER — OUTPATIENT (OUTPATIENT)
Dept: OUTPATIENT SERVICES | Facility: HOSPITAL | Age: 29
LOS: 1 days | End: 2019-04-08

## 2019-04-08 ENCOUNTER — APPOINTMENT (OUTPATIENT)
Dept: OBGYN | Facility: HOSPITAL | Age: 29
End: 2019-04-08
Payer: MEDICAID

## 2019-04-08 VITALS
SYSTOLIC BLOOD PRESSURE: 128 MMHG | WEIGHT: 241.56 LBS | DIASTOLIC BLOOD PRESSURE: 82 MMHG | HEART RATE: 113 BPM | BODY MASS INDEX: 44.45 KG/M2 | HEIGHT: 62 IN

## 2019-04-08 LAB
CREAT ?TM UR-MCNC: 162.5 MG/DL — SIGNIFICANT CHANGE UP
PROT UR-MCNC: 41.8 MG/DL — SIGNIFICANT CHANGE UP

## 2019-04-08 PROCEDURE — 99213 OFFICE O/P EST LOW 20 MIN: CPT | Mod: GC,25

## 2019-04-08 PROCEDURE — 81003 URINALYSIS AUTO W/O SCOPE: CPT | Mod: NC,GC,QW

## 2019-04-09 DIAGNOSIS — O24.419 GESTATIONAL DIABETES MELLITUS IN PREGNANCY, UNSPECIFIED CONTROL: ICD-10-CM

## 2019-04-09 DIAGNOSIS — O09.93 SUPERVISION OF HIGH RISK PREGNANCY, UNSPECIFIED, THIRD TRIMESTER: ICD-10-CM

## 2019-04-09 LAB — HIV 1+2 AB+HIV1 P24 AG SERPL QL IA: SIGNIFICANT CHANGE UP

## 2019-04-10 ENCOUNTER — NON-APPOINTMENT (OUTPATIENT)
Age: 29
End: 2019-04-10

## 2019-04-12 ENCOUNTER — OUTPATIENT (OUTPATIENT)
Dept: OUTPATIENT SERVICES | Facility: HOSPITAL | Age: 29
LOS: 1 days | End: 2019-04-12

## 2019-04-12 ENCOUNTER — APPOINTMENT (OUTPATIENT)
Dept: ANTEPARTUM | Facility: HOSPITAL | Age: 29
End: 2019-04-12

## 2019-04-12 ENCOUNTER — APPOINTMENT (OUTPATIENT)
Dept: ANTEPARTUM | Facility: CLINIC | Age: 29
End: 2019-04-12
Payer: MEDICAID

## 2019-04-12 ENCOUNTER — ASOB RESULT (OUTPATIENT)
Age: 29
End: 2019-04-12

## 2019-04-12 PROCEDURE — 76818 FETAL BIOPHYS PROFILE W/NST: CPT | Mod: 26

## 2019-04-12 PROCEDURE — 76816 OB US FOLLOW-UP PER FETUS: CPT | Mod: 26

## 2019-04-15 ENCOUNTER — APPOINTMENT (OUTPATIENT)
Dept: OBGYN | Facility: HOSPITAL | Age: 29
End: 2019-04-15

## 2019-04-16 ENCOUNTER — LABORATORY RESULT (OUTPATIENT)
Age: 29
End: 2019-04-16

## 2019-04-16 ENCOUNTER — NON-APPOINTMENT (OUTPATIENT)
Age: 29
End: 2019-04-16

## 2019-04-16 ENCOUNTER — APPOINTMENT (OUTPATIENT)
Dept: ANTEPARTUM | Facility: CLINIC | Age: 29
End: 2019-04-16

## 2019-04-16 ENCOUNTER — APPOINTMENT (OUTPATIENT)
Dept: OBGYN | Facility: HOSPITAL | Age: 29
End: 2019-04-16

## 2019-04-16 ENCOUNTER — OUTPATIENT (OUTPATIENT)
Dept: OUTPATIENT SERVICES | Facility: HOSPITAL | Age: 29
LOS: 1 days | End: 2019-04-16

## 2019-04-16 VITALS
BODY MASS INDEX: 44.63 KG/M2 | HEART RATE: 80 BPM | DIASTOLIC BLOOD PRESSURE: 63 MMHG | SYSTOLIC BLOOD PRESSURE: 125 MMHG | WEIGHT: 244 LBS

## 2019-04-16 DIAGNOSIS — O24.419 GESTATIONAL DIABETES MELLITUS IN PREGNANCY, UNSPECIFIED CONTROL: ICD-10-CM

## 2019-04-16 DIAGNOSIS — O09.93 SUPERVISION OF HIGH RISK PREGNANCY, UNSPECIFIED, THIRD TRIMESTER: ICD-10-CM

## 2019-04-17 LAB
C TRACH RRNA SPEC QL NAA+PROBE: SIGNIFICANT CHANGE UP
N GONORRHOEA RRNA SPEC QL NAA+PROBE: SIGNIFICANT CHANGE UP
SPECIMEN SOURCE: SIGNIFICANT CHANGE UP

## 2019-04-18 LAB — SPECIMEN SOURCE: SIGNIFICANT CHANGE UP

## 2019-04-19 ENCOUNTER — APPOINTMENT (OUTPATIENT)
Dept: ANTEPARTUM | Facility: CLINIC | Age: 29
End: 2019-04-19
Payer: MEDICAID

## 2019-04-19 ENCOUNTER — OUTPATIENT (OUTPATIENT)
Dept: OUTPATIENT SERVICES | Facility: HOSPITAL | Age: 29
LOS: 1 days | End: 2019-04-19

## 2019-04-19 ENCOUNTER — ASOB RESULT (OUTPATIENT)
Age: 29
End: 2019-04-19

## 2019-04-19 ENCOUNTER — APPOINTMENT (OUTPATIENT)
Dept: ANTEPARTUM | Facility: HOSPITAL | Age: 29
End: 2019-04-19

## 2019-04-19 LAB — GP B STREP GENITAL QL CULT: SIGNIFICANT CHANGE UP

## 2019-04-19 PROCEDURE — 76818 FETAL BIOPHYS PROFILE W/NST: CPT | Mod: 26

## 2019-04-22 ENCOUNTER — NON-APPOINTMENT (OUTPATIENT)
Age: 29
End: 2019-04-22

## 2019-04-22 ENCOUNTER — APPOINTMENT (OUTPATIENT)
Dept: OBGYN | Facility: HOSPITAL | Age: 29
End: 2019-04-22

## 2019-04-22 ENCOUNTER — OUTPATIENT (OUTPATIENT)
Dept: OUTPATIENT SERVICES | Facility: HOSPITAL | Age: 29
LOS: 1 days | End: 2019-04-22

## 2019-04-22 VITALS
BODY MASS INDEX: 44.99 KG/M2 | WEIGHT: 246 LBS | SYSTOLIC BLOOD PRESSURE: 108 MMHG | HEART RATE: 110 BPM | DIASTOLIC BLOOD PRESSURE: 57 MMHG

## 2019-04-22 DIAGNOSIS — O24.414 GESTATIONAL DIABETES MELLITUS IN PREGNANCY, INSULIN CONTROLLED: ICD-10-CM

## 2019-04-22 DIAGNOSIS — O99.213 OBESITY COMPLICATING PREGNANCY, THIRD TRIMESTER: ICD-10-CM

## 2019-04-22 DIAGNOSIS — Z3A.35 35 WEEKS GESTATION OF PREGNANCY: ICD-10-CM

## 2019-04-24 ENCOUNTER — NON-APPOINTMENT (OUTPATIENT)
Age: 29
End: 2019-04-24

## 2019-04-24 DIAGNOSIS — O09.93 SUPERVISION OF HIGH RISK PREGNANCY, UNSPECIFIED, THIRD TRIMESTER: ICD-10-CM

## 2019-04-24 DIAGNOSIS — O24.419 GESTATIONAL DIABETES MELLITUS IN PREGNANCY, UNSPECIFIED CONTROL: ICD-10-CM

## 2019-04-24 DIAGNOSIS — O24.414 GESTATIONAL DIABETES MELLITUS IN PREGNANCY, INSULIN CONTROLLED: ICD-10-CM

## 2019-04-26 ENCOUNTER — OUTPATIENT (OUTPATIENT)
Dept: OUTPATIENT SERVICES | Facility: HOSPITAL | Age: 29
LOS: 1 days | End: 2019-04-26

## 2019-04-26 ENCOUNTER — APPOINTMENT (OUTPATIENT)
Dept: ANTEPARTUM | Facility: HOSPITAL | Age: 29
End: 2019-04-26

## 2019-04-26 ENCOUNTER — APPOINTMENT (OUTPATIENT)
Dept: ANTEPARTUM | Facility: CLINIC | Age: 29
End: 2019-04-26
Payer: MEDICAID

## 2019-04-26 ENCOUNTER — ASOB RESULT (OUTPATIENT)
Age: 29
End: 2019-04-26

## 2019-04-26 PROCEDURE — 59025 FETAL NON-STRESS TEST: CPT | Mod: 26

## 2019-04-29 ENCOUNTER — APPOINTMENT (OUTPATIENT)
Dept: OBGYN | Facility: HOSPITAL | Age: 29
End: 2019-04-29
Payer: MEDICAID

## 2019-04-29 ENCOUNTER — NON-APPOINTMENT (OUTPATIENT)
Age: 29
End: 2019-04-29

## 2019-04-29 ENCOUNTER — OUTPATIENT (OUTPATIENT)
Dept: OUTPATIENT SERVICES | Facility: HOSPITAL | Age: 29
LOS: 1 days | End: 2019-04-29

## 2019-04-29 VITALS
SYSTOLIC BLOOD PRESSURE: 121 MMHG | HEART RATE: 102 BPM | DIASTOLIC BLOOD PRESSURE: 71 MMHG | BODY MASS INDEX: 45.16 KG/M2 | WEIGHT: 246.91 LBS

## 2019-04-29 PROCEDURE — 81002 URINALYSIS NONAUTO W/O SCOPE: CPT | Mod: NC,GC

## 2019-04-29 PROCEDURE — 99213 OFFICE O/P EST LOW 20 MIN: CPT | Mod: 25,GC

## 2019-04-30 DIAGNOSIS — O24.414 GESTATIONAL DIABETES MELLITUS IN PREGNANCY, INSULIN CONTROLLED: ICD-10-CM

## 2019-04-30 DIAGNOSIS — O24.419 GESTATIONAL DIABETES MELLITUS IN PREGNANCY, UNSPECIFIED CONTROL: ICD-10-CM

## 2019-04-30 DIAGNOSIS — O99.213 OBESITY COMPLICATING PREGNANCY, THIRD TRIMESTER: ICD-10-CM

## 2019-04-30 DIAGNOSIS — Z34.92 ENCOUNTER FOR SUPERVISION OF NORMAL PREGNANCY, UNSPECIFIED, SECOND TRIMESTER: ICD-10-CM

## 2019-04-30 DIAGNOSIS — O09.93 SUPERVISION OF HIGH RISK PREGNANCY, UNSPECIFIED, THIRD TRIMESTER: ICD-10-CM

## 2019-04-30 DIAGNOSIS — Z3A.36 36 WEEKS GESTATION OF PREGNANCY: ICD-10-CM

## 2019-05-01 ENCOUNTER — NON-APPOINTMENT (OUTPATIENT)
Age: 29
End: 2019-05-01

## 2019-05-01 ENCOUNTER — OUTPATIENT (OUTPATIENT)
Dept: OUTPATIENT SERVICES | Facility: HOSPITAL | Age: 29
LOS: 1 days | End: 2019-05-01

## 2019-05-01 VITALS
HEART RATE: 100 BPM | TEMPERATURE: 97 F | RESPIRATION RATE: 18 BRPM | DIASTOLIC BLOOD PRESSURE: 64 MMHG | OXYGEN SATURATION: 98 % | HEIGHT: 63.25 IN | WEIGHT: 246.04 LBS | SYSTOLIC BLOOD PRESSURE: 118 MMHG

## 2019-05-01 DIAGNOSIS — O24.419 GESTATIONAL DIABETES MELLITUS IN PREGNANCY, UNSPECIFIED CONTROL: ICD-10-CM

## 2019-05-01 DIAGNOSIS — O24.012 PRE-EXISTING TYPE 1 DIABETES MELLITUS, IN PREGNANCY, SECOND TRIMESTER: ICD-10-CM

## 2019-05-01 DIAGNOSIS — R58 HEMORRHAGE, NOT ELSEWHERE CLASSIFIED: Chronic | ICD-10-CM

## 2019-05-01 DIAGNOSIS — R22.9 LOCALIZED SWELLING, MASS AND LUMP, UNSPECIFIED: Chronic | ICD-10-CM

## 2019-05-01 DIAGNOSIS — Z34.90 ENCOUNTER FOR SUPERVISION OF NORMAL PREGNANCY, UNSPECIFIED, UNSPECIFIED TRIMESTER: ICD-10-CM

## 2019-05-01 DIAGNOSIS — R06.83 SNORING: ICD-10-CM

## 2019-05-01 LAB
ANION GAP SERPL CALC-SCNC: 15 MMO/L — HIGH (ref 7–14)
APPEARANCE UR: SIGNIFICANT CHANGE UP
BACTERIA # UR AUTO: SIGNIFICANT CHANGE UP
BILIRUB UR-MCNC: NEGATIVE — SIGNIFICANT CHANGE UP
BLD GP AB SCN SERPL QL: NEGATIVE — SIGNIFICANT CHANGE UP
BLOOD UR QL VISUAL: NEGATIVE — SIGNIFICANT CHANGE UP
BUN SERPL-MCNC: 6 MG/DL — LOW (ref 7–23)
CALCIUM SERPL-MCNC: 9.7 MG/DL — SIGNIFICANT CHANGE UP (ref 8.4–10.5)
CHLORIDE SERPL-SCNC: 101 MMOL/L — SIGNIFICANT CHANGE UP (ref 98–107)
CO2 SERPL-SCNC: 21 MMOL/L — LOW (ref 22–31)
COLOR SPEC: YELLOW — SIGNIFICANT CHANGE UP
CREAT SERPL-MCNC: 0.74 MG/DL — SIGNIFICANT CHANGE UP (ref 0.5–1.3)
GLUCOSE SERPL-MCNC: 101 MG/DL — HIGH (ref 70–99)
GLUCOSE UR-MCNC: 300 — HIGH
HCT VFR BLD CALC: 36 % — SIGNIFICANT CHANGE UP (ref 34.5–45)
HGB BLD-MCNC: 11.7 G/DL — SIGNIFICANT CHANGE UP (ref 11.5–15.5)
HYALINE CASTS # UR AUTO: HIGH
KETONES UR-MCNC: NEGATIVE — SIGNIFICANT CHANGE UP
LEUKOCYTE ESTERASE UR-ACNC: SIGNIFICANT CHANGE UP
MCHC RBC-ENTMCNC: 31.5 PG — SIGNIFICANT CHANGE UP (ref 27–34)
MCHC RBC-ENTMCNC: 32.5 % — SIGNIFICANT CHANGE UP (ref 32–36)
MCV RBC AUTO: 97 FL — SIGNIFICANT CHANGE UP (ref 80–100)
NITRITE UR-MCNC: NEGATIVE — SIGNIFICANT CHANGE UP
NRBC # FLD: 0 K/UL — SIGNIFICANT CHANGE UP (ref 0–0)
PH UR: 6.5 — SIGNIFICANT CHANGE UP (ref 5–8)
PLATELET # BLD AUTO: 278 K/UL — SIGNIFICANT CHANGE UP (ref 150–400)
PMV BLD: 10 FL — SIGNIFICANT CHANGE UP (ref 7–13)
POTASSIUM SERPL-MCNC: 4.5 MMOL/L — SIGNIFICANT CHANGE UP (ref 3.5–5.3)
POTASSIUM SERPL-SCNC: 4.5 MMOL/L — SIGNIFICANT CHANGE UP (ref 3.5–5.3)
PROT UR-MCNC: 30 — SIGNIFICANT CHANGE UP
RBC # BLD: 3.71 M/UL — LOW (ref 3.8–5.2)
RBC # FLD: 12.6 % — SIGNIFICANT CHANGE UP (ref 10.3–14.5)
RBC CASTS # UR COMP ASSIST: SIGNIFICANT CHANGE UP (ref 0–?)
RH IG SCN BLD-IMP: POSITIVE — SIGNIFICANT CHANGE UP
SODIUM SERPL-SCNC: 137 MMOL/L — SIGNIFICANT CHANGE UP (ref 135–145)
SP GR SPEC: 1.02 — SIGNIFICANT CHANGE UP (ref 1–1.04)
SQUAMOUS # UR AUTO: SIGNIFICANT CHANGE UP
UROBILINOGEN FLD QL: NORMAL — SIGNIFICANT CHANGE UP
WBC # BLD: 9.27 K/UL — SIGNIFICANT CHANGE UP (ref 3.8–10.5)
WBC # FLD AUTO: 9.27 K/UL — SIGNIFICANT CHANGE UP (ref 3.8–10.5)
WBC UR QL: SIGNIFICANT CHANGE UP (ref 0–?)

## 2019-05-01 NOTE — OB PST NOTE - NSHPREVIEWOFSYSTEMS_GEN_ALL_CORE
General: No fever, chills, sweating,     Skin: No rashes, itching, or dryness. No change in size/color of moles. No tumors, brittle nails, pitted nails, or hair loss    Breast: No tenderness, lumps, or nipple discharge      Ophthalmologic: No diplopia, photophobia, lacrimation, blurred Vision , or eye discharge    ENMT Symptoms: No hearing difficulty, ear pain, tinnitus, or vertigo. No sinus symptoms, nasal congestion, nasal   discharge, or nasal obstruction    Respiratory and Thorax: No wheezing, dyspnea, cough, hemoptysis, or pleuritic chest pPain     Cardiovascular: No chest pain, palpitations, dyspnea on exertion, orthopnea, paroxysmal nocturnal dyspnea,   peripheral edema, or claudication    Gastrointestinal: No nausea, vomiting, diarrhea, constipation, change in bowel habits, flatulence, abdominal pain, or melena    Genitourinary/ Pelvis: No hematuria, renal colic, or flank pain.  No urine discoloration, incontinence, dysuria, or urinary hesitancy. Normal urinary frequency. No nocturia, abnormal vaginal bleeding, vaginal discharge, spotting, pelvic pain, or vaginal leakage    Musculoskeletal: No arthralgia, arthritis, joint swelling, muscle cramping, muscle weakness, neck pain, arm pain, or leg pain    Neurological: No transient paralysis, weakness, paresthesias, or seizures. No syncope, tremors, vertigo, loss of sensation, difficulty walking, loss of consciousness, hemiparesis, confusion, or facial palsy    Psychiatric: No suicidal ideation, depression, anxiety, insomnia, memory loss, paranoia, mood swings, agitation, hallucinations, or hyperactivity    Hematology: No gum bleeding, nose bleeding, or skin lumps    Lymphatic: No enlarged or tender lymph nodes. No extremity swelling    Endocrine: No heat or cold intolerance    Immunologic: No recurrent or persistent infections General: No fever, chills, sweating,     Skin: No rashes, itching, or dryness.     Breast: No tenderness,    Ophthalmologic: No diplopia, photophobia,    ENMT Symptoms: No hearing difficulty, no nasal congestion    Respiratory and Thorax: could climb stairs without SOB except for last 2 months of pregnancy -- METS 4;  No wheezing, cough    Cardiovascular: could climb stairs without SOB except for last 2 months of pregnancy -- METS 4;  No chest pain, palpitations,     Gastrointestinal: No nausea, vomiting, diarrhea, constipation, no abdominal pain    Genitourinary/ Pelvis: No hematuria, renal colic, or flank pain.     Musculoskeletal: No arthralgia,     Neurological: No  seizures, no cva, no migraines    Psychiatric: No suicidal ideation, depression, anxiety,     Hematology: No gum bleeding, nose bleeding,     Lymphatic: No enlarged or tender lymph nodes. No extremity swelling    Endocrine: No heat or cold intolerance    Immunologic: No recurrent or persistent infections

## 2019-05-01 NOTE — OB PST NOTE - NS_MATERNALCONCERNS_OBGYN_ALL_OB_FT
gestational diabetes -- finger sticks in am range 's; range in afternoon 130-140's; range in the pm 140-150's

## 2019-05-01 NOTE — OB PST NOTE - PMH
Gestational diabetes  on insulin  Miscarriage  2016  Pregnancy  this pregnancy with LEIGHTON 5-12-19

## 2019-05-01 NOTE — OB PST NOTE - NS_OBGYNHISTORY_OBGYN_ALL_OB_FT
gestational diabetes -- finger sticks in am range 's; range in afternoon 130-140's; range in the pm 140-150's.  * one miscarriage  * present pregnancy with LEIGHTON 5-12-19  * D&C "long time ago"

## 2019-05-01 NOTE — OB PST NOTE - PROBLEM SELECTOR PLAN 1
This is a  29 y/o female who on 5-6-19 is scheduled for vaginal cytotec induction of labor for GDMA2 with LEIGHTON 5-12-19  * Given preop instructions

## 2019-05-01 NOTE — OB PST NOTE - NSHPPHYSICALEXAM_GEN_ALL_CORE
Constitutional: Well Developed, Well Groomed, Well Nourished, No Distress    Eyes:  EOMI, conjunctiva clear    Ears: Normal    Mouth & Gums: Normal, moist    Pharynx: No tenderness,     Tonsils: No Redness,     Neck: Supple,  normal thyroid glands,     Breast:  no tenderness,     Back: Normal shape, ROM intact    Respiratory: CTA, no rales, no rhonchi, no wheezing    Cardiovascular:  Regular rate and rhythm, no rubs or murmur,     Gastrointestinal: Soft, non-tender, non distention, no masses palpable, no abdominal pain    Extremities: slight pedal edema    Vascular: Radial Pulse normal,    Neurological: alert & oriented x 3,     Skin: warm and dry,    Lymph Nodes: no palpable lymph nodes    Musculoskeletal: ROM intact,     Psychiatric: normal affect, normal behavior

## 2019-05-01 NOTE — OB PST NOTE - PROBLEM SELECTOR PLAN 2
Instructed her to call her OB-GYN and ask whether or not she is to take insulin and the dosage the night and am of surgery

## 2019-05-01 NOTE — OB PST NOTE - FAMILY HISTORY
Father  Still living? Yes, Estimated age: Age Unknown  Family history of hypertension, Age at diagnosis: Age Unknown  Family history of epilepsy in father, Age at diagnosis: Age Unknown

## 2019-05-01 NOTE — OB PST NOTE - HISTORY OF PRESENT ILLNESS
This is a 27 y/o female who on 5-6-19 is scheduled for vaginal cytotec induction of labor for GDMA2 with LEIGHTON 5-12-19. Last fetal movement approximately 10 minutes ago; denies vaginal bleeding

## 2019-05-02 PROBLEM — O03.9 COMPLETE OR UNSPECIFIED SPONTANEOUS ABORTION WITHOUT COMPLICATION: Chronic | Status: ACTIVE | Noted: 2019-05-01

## 2019-05-02 LAB — T PALLIDUM AB TITR SER: NEGATIVE — SIGNIFICANT CHANGE UP

## 2019-05-03 ENCOUNTER — ASOB RESULT (OUTPATIENT)
Age: 29
End: 2019-05-03

## 2019-05-03 ENCOUNTER — APPOINTMENT (OUTPATIENT)
Dept: ANTEPARTUM | Facility: CLINIC | Age: 29
End: 2019-05-03
Payer: MEDICAID

## 2019-05-03 ENCOUNTER — APPOINTMENT (OUTPATIENT)
Dept: ANTEPARTUM | Facility: HOSPITAL | Age: 29
End: 2019-05-03

## 2019-05-03 ENCOUNTER — OUTPATIENT (OUTPATIENT)
Dept: OUTPATIENT SERVICES | Facility: HOSPITAL | Age: 29
LOS: 1 days | End: 2019-05-03

## 2019-05-03 DIAGNOSIS — O24.414 GESTATIONAL DIABETES MELLITUS IN PREGNANCY, INSULIN CONTROLLED: ICD-10-CM

## 2019-05-03 DIAGNOSIS — R22.9 LOCALIZED SWELLING, MASS AND LUMP, UNSPECIFIED: Chronic | ICD-10-CM

## 2019-05-03 DIAGNOSIS — R58 HEMORRHAGE, NOT ELSEWHERE CLASSIFIED: Chronic | ICD-10-CM

## 2019-05-03 DIAGNOSIS — Z3A.37 37 WEEKS GESTATION OF PREGNANCY: ICD-10-CM

## 2019-05-03 DIAGNOSIS — O99.213 OBESITY COMPLICATING PREGNANCY, THIRD TRIMESTER: ICD-10-CM

## 2019-05-03 PROCEDURE — 76818 FETAL BIOPHYS PROFILE W/NST: CPT | Mod: 26

## 2019-05-05 LAB
BACTERIA UR CULT: SIGNIFICANT CHANGE UP
SPECIMEN SOURCE: SIGNIFICANT CHANGE UP

## 2019-05-06 ENCOUNTER — APPOINTMENT (OUTPATIENT)
Dept: OBGYN | Facility: HOSPITAL | Age: 29
End: 2019-05-06

## 2019-05-06 ENCOUNTER — INPATIENT (INPATIENT)
Facility: HOSPITAL | Age: 29
LOS: 4 days | Discharge: HOME | End: 2019-05-11
Attending: OBSTETRICS & GYNECOLOGY | Admitting: OBSTETRICS & GYNECOLOGY
Payer: MEDICAID

## 2019-05-06 DIAGNOSIS — R58 HEMORRHAGE, NOT ELSEWHERE CLASSIFIED: Chronic | ICD-10-CM

## 2019-05-06 DIAGNOSIS — R22.9 LOCALIZED SWELLING, MASS AND LUMP, UNSPECIFIED: Chronic | ICD-10-CM

## 2019-05-06 DIAGNOSIS — O24.012 PRE-EXISTING TYPE 1 DIABETES MELLITUS, IN PREGNANCY, SECOND TRIMESTER: ICD-10-CM

## 2019-05-06 LAB
BASOPHILS # BLD AUTO: 0.01 K/UL — SIGNIFICANT CHANGE UP (ref 0–0.2)
BASOPHILS NFR BLD AUTO: 0.1 % — SIGNIFICANT CHANGE UP (ref 0–2)
BLD GP AB SCN SERPL QL: NEGATIVE — SIGNIFICANT CHANGE UP
EOSINOPHIL # BLD AUTO: 0.04 K/UL — SIGNIFICANT CHANGE UP (ref 0–0.5)
EOSINOPHIL NFR BLD AUTO: 0.4 % — SIGNIFICANT CHANGE UP (ref 0–6)
GLUCOSE BLDC GLUCOMTR-MCNC: 111 MG/DL — HIGH (ref 70–99)
GLUCOSE BLDC GLUCOMTR-MCNC: 95 MG/DL — SIGNIFICANT CHANGE UP (ref 70–99)
HCT VFR BLD CALC: 33.8 % — LOW (ref 34.5–45)
HGB BLD-MCNC: 11.1 G/DL — LOW (ref 11.5–15.5)
IMM GRANULOCYTES NFR BLD AUTO: 0.5 % — SIGNIFICANT CHANGE UP (ref 0–1.5)
LYMPHOCYTES # BLD AUTO: 2.31 K/UL — SIGNIFICANT CHANGE UP (ref 1–3.3)
LYMPHOCYTES # BLD AUTO: 24.3 % — SIGNIFICANT CHANGE UP (ref 13–44)
MCHC RBC-ENTMCNC: 31.5 PG — SIGNIFICANT CHANGE UP (ref 27–34)
MCHC RBC-ENTMCNC: 32.8 % — SIGNIFICANT CHANGE UP (ref 32–36)
MCV RBC AUTO: 96 FL — SIGNIFICANT CHANGE UP (ref 80–100)
MONOCYTES # BLD AUTO: 0.67 K/UL — SIGNIFICANT CHANGE UP (ref 0–0.9)
MONOCYTES NFR BLD AUTO: 7 % — SIGNIFICANT CHANGE UP (ref 2–14)
NEUTROPHILS # BLD AUTO: 6.44 K/UL — SIGNIFICANT CHANGE UP (ref 1.8–7.4)
NEUTROPHILS NFR BLD AUTO: 67.7 % — SIGNIFICANT CHANGE UP (ref 43–77)
NRBC # FLD: 0 K/UL — SIGNIFICANT CHANGE UP (ref 0–0)
PLATELET # BLD AUTO: 259 K/UL — SIGNIFICANT CHANGE UP (ref 150–400)
PMV BLD: 10 FL — SIGNIFICANT CHANGE UP (ref 7–13)
RBC # BLD: 3.52 M/UL — LOW (ref 3.8–5.2)
RBC # FLD: 12.6 % — SIGNIFICANT CHANGE UP (ref 10.3–14.5)
RH IG SCN BLD-IMP: POSITIVE — SIGNIFICANT CHANGE UP
WBC # BLD: 9.52 K/UL — SIGNIFICANT CHANGE UP (ref 3.8–10.5)
WBC # FLD AUTO: 9.52 K/UL — SIGNIFICANT CHANGE UP (ref 3.8–10.5)

## 2019-05-06 RX ORDER — SODIUM CHLORIDE 9 MG/ML
1000 INJECTION, SOLUTION INTRAVENOUS
Qty: 0 | Refills: 0 | Status: DISCONTINUED | OUTPATIENT
Start: 2019-05-06 | End: 2019-05-08

## 2019-05-06 RX ORDER — OXYTOCIN 10 UNIT/ML
333.33 VIAL (ML) INJECTION
Qty: 20 | Refills: 0 | Status: DISCONTINUED | OUTPATIENT
Start: 2019-05-06 | End: 2019-05-09

## 2019-05-06 RX ORDER — CITRIC ACID/SODIUM CITRATE 300-500 MG
15 SOLUTION, ORAL ORAL EVERY 6 HOURS
Qty: 0 | Refills: 0 | Status: DISCONTINUED | OUTPATIENT
Start: 2019-05-06 | End: 2019-05-08

## 2019-05-06 RX ORDER — SODIUM CHLORIDE 9 MG/ML
1000 INJECTION INTRAMUSCULAR; INTRAVENOUS; SUBCUTANEOUS
Qty: 0 | Refills: 0 | Status: COMPLETED | OUTPATIENT
Start: 2019-05-06 | End: 2019-05-06

## 2019-05-06 RX ADMIN — SODIUM CHLORIDE 125 MILLILITER(S): 9 INJECTION INTRAMUSCULAR; INTRAVENOUS; SUBCUTANEOUS at 20:15

## 2019-05-06 NOTE — OB PROVIDER H&P - NSHPPHYSICALEXAM_GEN_ALL_CORE
EFM:   [place]:  VE: Closed/Long  [place]: Vtx 145, mod lacey, +15x15 accels, no decel  [place]: No Ctx  VE: Closed/Long  [place]: Vtx

## 2019-05-06 NOTE — OB PROVIDER H&P - ATTENDING COMMENTS
P0 at 39.1wks (not 38+wks as indicated in H&P calculated GA) with GDMA2 presents for IOL  Agree with above assessment and plan

## 2019-05-06 NOTE — OB PROVIDER H&P - ASSESSMENT
The patient is a 29YO  @ 39wks presenting for IOL for GDMA2.   -Admit to L&D, routine labs, IVF (rotating)  -FS monitoring  -IOL With: PO  -EFM + [place]: Cat 1  -GBS: Neg  -EFW:   -PNC: GDMA2    Zev Lira PGY-1 d/w  The patient is a 27YO  @ 39wks presenting for IOL for GDMA2.   -Admit to L&D, routine labs, IVF (rotating)  -FS monitoring  -IOL With: PO  -EFM + [place]: Cat 1  -GBS: Neg  -EFW: 3700  -PNC: GDMA2    Zev Lira PGY-1 d/w

## 2019-05-06 NOTE — OB PROVIDER H&P - HISTORY OF PRESENT ILLNESS
This is a 29 y/o  who on 19 is scheduled for vaginal cytotec induction of labor for GDMA2 with LEIGHTON 19. Presenting for scheduled IOL / A2. +FM. Neg Ctx/Bleeding/discharge. GBS neg.     ObHx: 1x SAB  GYN: PCOS, Hx abnormal pap when she was 18  PMH: Neg  PSH: Foot surgery  Meds: Basaglar 18u PM, Lispro 8 w/ meal, PNV  All: NKDA, Shellfish  Soc: Negx3  Psych: neg This is a 27 y/o  who on 19 is scheduled for vaginal cytotec induction of labor for GDMA2 with LEIGHTON 19. Presenting for scheduled IOL / A2. +FM. Neg Ctx/Bleeding/discharge. GBS neg. EFW 3700    ObHx: 1x SAB  GYN: PCOS, Hx abnormal pap when she was 18  PMH: Neg  PSH: Foot surgery  Meds: Basaglar 18u PM, Lispro 8 w/ meal, PNV  All: NKDA, Shellfish  Soc: Negx3  Psych: neg This is a 29 y/o  who on 19 is scheduled for vaginal cytotec induction of labor for GDMA2 with LEIGHTON 19. Presenting for scheduled IOL / A2. +FM. Neg Ctx/Bleeding/discharge. GBS neg. EFW 3700 ([place]). Previous sono  EFM 3077 81%, AC >97%    ObHx: 1x SAB  GYN: PCOS, Hx abnormal pap when she was 18  PMH: Neg  PSH: Foot surgery  Meds:  PNV, Levemir 22AM, 52QHS Humalog 18  All: NKDA, Shellfish  Soc: Negx3  Psych: neg

## 2019-05-06 NOTE — OB PROVIDER H&P - NSHPREVIEWOFSYSTEMS_GEN_ALL_CORE
REVIEW OF SYSTEMS:    CONSTITUTIONAL: Denies weakness, fevers or chills  HEENT: Denies headache, blurry vision  NECK: Denies pain or stiffness  RESPIRATORY: Denies cough, shortness of breathe  CARDIOVASCULAR: Denies chest pain or palpitations  GASTROINTESTINAL: Denies abdominal or epigastric pain.  Denies nausea, vomiting, or hematemesis.  Denies diarrhea or constipation. Denies melena or hematochezia.  GENITOURINARY: Denies dysuria, back pain, frequency, hesitancy or hematuria.     NEUROLOGICAL: Denies numbness or weakness  SKIN: Denies itching, rashes

## 2019-05-07 ENCOUNTER — TRANSCRIPTION ENCOUNTER (OUTPATIENT)
Age: 29
End: 2019-05-07

## 2019-05-07 LAB
GLUCOSE BLDC GLUCOMTR-MCNC: 101 MG/DL — HIGH (ref 70–99)
GLUCOSE BLDC GLUCOMTR-MCNC: 83 MG/DL — SIGNIFICANT CHANGE UP (ref 70–99)
GLUCOSE BLDC GLUCOMTR-MCNC: 88 MG/DL — SIGNIFICANT CHANGE UP (ref 70–99)
GLUCOSE BLDC GLUCOMTR-MCNC: 94 MG/DL — SIGNIFICANT CHANGE UP (ref 70–99)
GLUCOSE BLDC GLUCOMTR-MCNC: 94 MG/DL — SIGNIFICANT CHANGE UP (ref 70–99)
T PALLIDUM AB TITR SER: NEGATIVE — SIGNIFICANT CHANGE UP

## 2019-05-07 RX ORDER — SODIUM CHLORIDE 9 MG/ML
500 INJECTION, SOLUTION INTRAVENOUS ONCE
Qty: 0 | Refills: 0 | Status: COMPLETED | OUTPATIENT
Start: 2019-05-07 | End: 2019-05-07

## 2019-05-07 RX ORDER — OXYTOCIN 10 UNIT/ML
2 VIAL (ML) INJECTION
Qty: 30 | Refills: 0 | Status: DISCONTINUED | OUTPATIENT
Start: 2019-05-07 | End: 2019-05-07

## 2019-05-07 RX ORDER — OXYTOCIN 10 UNIT/ML
1 VIAL (ML) INJECTION
Qty: 30 | Refills: 0 | Status: DISCONTINUED | OUTPATIENT
Start: 2019-05-07 | End: 2019-05-08

## 2019-05-07 RX ADMIN — Medication 2 MILLIUNIT(S)/MIN: at 17:25

## 2019-05-07 RX ADMIN — SODIUM CHLORIDE 1000 MILLILITER(S): 9 INJECTION, SOLUTION INTRAVENOUS at 13:40

## 2019-05-07 RX ADMIN — SODIUM CHLORIDE 125 MILLILITER(S): 9 INJECTION, SOLUTION INTRAVENOUS at 00:00

## 2019-05-07 NOTE — CHART NOTE - NSCHARTNOTEFT_GEN_A_CORE
R1 Note 05-07-19 @ 23:19    Pt evaluated for progression.    VITALS:  T(C): 36.7 (05-07-19 @ 22:06), Max: 37.4 (05-07-19 @ 13:59)  HR: 103 (05-07-19 @ 23:18) (92 - 124)  BP: 127/62 (05-07-19 @ 23:08) (91/54 - 163/80)  RR: 18 (05-07-19 @ 13:59) (18 - 18)  SpO2: 100% (05-07-19 @ 23:13) (91% - 100%)    EFM: , mod lacey, +[id number], no decel  [place]: Ctx Q3min  VE: 6/100/-2      IMPRESSION:   FHR Category: 1  Additional:    PLAN:  Cytotec:  [ ] Continue PO Cyto     [ ] Continue VCyto   [ ] Stop Cytotec  Pitocin: [ ] Start Pitocin   [ ] Increase Pitocin  [  ] Continue Pitocin  [ ] Decrease Pitocin   [ ] Discontinue Pitocin  [x ]Expectant management [ ]Peanut ball [ ]Labor down  [ ]Re-evaluate  Additional:    Zev Lira PGY-1

## 2019-05-07 NOTE — CHART NOTE - NSCHARTNOTEFT_GEN_A_CORE
NP note    Pt seen for placement of IUPC and ISE due to discontinuity of tracing.   SVE 4/90/-2 unchanged  IUPC/ISE placed without difficulty. Pt tolerated well.   -Initiate pitocin once ctx pattern allows       sredze, NP

## 2019-05-07 NOTE — CHART NOTE - NSCHARTNOTEFT_GEN_A_CORE
P0 IOL for GDMA2 w/ epidural. Had recent recurrent late decels after epidural placement. Pt comfortable.   examined for progress and fht  SVE: 4/90/-2, AROM bloody fluid    FHT: 130/mod lacey/no accel/no decels (currently, last 20 mins)  [place]: irreg ctx    peanut ball placed  left lateral  cont resuscitation with IVF and O2  will add pitocin if no change or contractions space  toco to be adjusted    Andrew VILLANUEVA R4

## 2019-05-07 NOTE — CHART NOTE - NSCHARTNOTEFT_GEN_A_CORE
R1 prog note    Pt examined due s/p epidural, tracing with multiple variable decels      VE: 4/90/-3  EFM: 140/mod/+accels/intermittent variables   [place]:Q 2-4min      T(C): 37.4 (05-07-19 @ 13:59), Max: 37.4 (05-07-19 @ 13:59)  HR: 106 (05-07-19 @ 14:25) (94 - 122)  BP: 118/56 (05-07-19 @ 14:25) (106/54 - 163/80)  RR: 18 (05-07-19 @ 13:59) (14 - 18)  SpO2: 97% (05-07-19 @ 14:24) (97% - 100%)      Plan:  -Pt in left lateral position, fluids running  -4pitocin once tracing settles        D/w Dr. Marietta esparza pgy-1

## 2019-05-07 NOTE — CHART NOTE - NSCHARTNOTEFT_GEN_A_CORE
NP labor note:    Pt seen s/p cervical balloon displacement, patient requesting epidural for pain.     PE:  VS  ICU Vital Signs Last 24 Hrs  T(C): 36.6 (07 May 2019 11:59), Max: 36.8 (06 May 2019 20:27)  T(F): 97.88 (07 May 2019 11:59), Max: 98.24 (06 May 2019 23:59)  HR: 111 (07 May 2019 12:49) (94 - 114)  BP: 138/87 (07 May 2019 11:59) (106/54 - 163/80)  BP(mean): --  ABP: --  ABP(mean): --  RR: 14 (06 May 2019 20:27) (14 - 14)  SpO2: 99% (07 May 2019 12:49) (99% - 99%)  EFM: 135 moderate variability no acels no decels  [place]: Q 2min  VE:4/70/-3    Plan:  -continue PO cytotec  -called for epidural placement  -continue current management    d/w Dr. Dorian Swiftteodora [facility]

## 2019-05-07 NOTE — CHART NOTE - NSCHARTNOTEFT_GEN_A_CORE
S:  Pt seen and examined for cervical change; without complaints at this time.       O:   T(C): 36.8 (20:00)  HR: 104 (21:33)  BP: 119/58 (21:23)  RR: 18 (13:59)  SpO2: 91% (21:30)  SVE: 5/-3  EFM: baseline 135, mod lacey, +accels, no decels  [place]: ctxns q3-4m        A/P: 28y  IOL for A2  -plan to place peanut ball to assist with descent   -consider restarting low dose pitocin 1x1 if no progress when reexamined at 11p    d/w Dr. Pamella Wilson PGY2

## 2019-05-07 NOTE — CHART NOTE - NSCHARTNOTEFT_GEN_A_CORE
R1 prog note    Pt examined due for cervical change and placement of CB      VE:1/90/-3, CB placed 60cc NS in uterine, 60cc in vaginal  EFM:130/mod/+accels/-decels  [place]:Q2-3mion      T(C): 36.8 (05-07-19 @ 07:59), Max: 36.8 (05-06-19 @ 20:27)  HR: 114 (05-07-19 @ 08:42) (94 - 114)  BP: 131/83 (05-07-19 @ 08:42) (106/54 - 163/80)  RR: 14 (05-06-19 @ 20:27) (14 - 14)        Plan:  -Continue IOL with CB&PO, 62 due at 12p      D/w Dr. Marietta esparza pgy-1

## 2019-05-08 ENCOUNTER — TRANSCRIPTION ENCOUNTER (OUTPATIENT)
Age: 29
End: 2019-05-08

## 2019-05-08 ENCOUNTER — RESULT REVIEW (OUTPATIENT)
Age: 29
End: 2019-05-08

## 2019-05-08 LAB
GLUCOSE BLDC GLUCOMTR-MCNC: 123 MG/DL — HIGH (ref 70–99)
GLUCOSE BLDC GLUCOMTR-MCNC: 98 MG/DL — SIGNIFICANT CHANGE UP (ref 70–99)

## 2019-05-08 PROCEDURE — 59514 CESAREAN DELIVERY ONLY: CPT | Mod: U9,UB,GC

## 2019-05-08 PROCEDURE — 88307 TISSUE EXAM BY PATHOLOGIST: CPT | Mod: 26

## 2019-05-08 RX ORDER — ONDANSETRON 8 MG/1
4 TABLET, FILM COATED ORAL EVERY 6 HOURS
Qty: 0 | Refills: 0 | Status: DISCONTINUED | OUTPATIENT
Start: 2019-05-08 | End: 2019-05-09

## 2019-05-08 RX ORDER — HYDROMORPHONE HYDROCHLORIDE 2 MG/ML
1 INJECTION INTRAMUSCULAR; INTRAVENOUS; SUBCUTANEOUS EVERY 6 HOURS
Qty: 0 | Refills: 0 | Status: DISCONTINUED | OUTPATIENT
Start: 2019-05-08 | End: 2019-05-08

## 2019-05-08 RX ORDER — OXYCODONE HYDROCHLORIDE 5 MG/1
5 TABLET ORAL
Qty: 0 | Refills: 0 | Status: DISCONTINUED | OUTPATIENT
Start: 2019-05-08 | End: 2019-05-11

## 2019-05-08 RX ORDER — OXYCODONE HYDROCHLORIDE 5 MG/1
10 TABLET ORAL
Qty: 0 | Refills: 0 | Status: DISCONTINUED | OUTPATIENT
Start: 2019-05-08 | End: 2019-05-08

## 2019-05-08 RX ORDER — OXYCODONE HYDROCHLORIDE 5 MG/1
10 TABLET ORAL
Qty: 0 | Refills: 0 | Status: DISCONTINUED | OUTPATIENT
Start: 2019-05-08 | End: 2019-05-09

## 2019-05-08 RX ORDER — TETANUS TOXOID, REDUCED DIPHTHERIA TOXOID AND ACELLULAR PERTUSSIS VACCINE, ADSORBED 5; 2.5; 8; 8; 2.5 [IU]/.5ML; [IU]/.5ML; UG/.5ML; UG/.5ML; UG/.5ML
0.5 SUSPENSION INTRAMUSCULAR ONCE
Qty: 0 | Refills: 0 | Status: DISCONTINUED | OUTPATIENT
Start: 2019-05-08 | End: 2019-05-11

## 2019-05-08 RX ORDER — OXYCODONE HYDROCHLORIDE 5 MG/1
5 TABLET ORAL
Qty: 0 | Refills: 0 | Status: DISCONTINUED | OUTPATIENT
Start: 2019-05-08 | End: 2019-05-08

## 2019-05-08 RX ORDER — SODIUM CHLORIDE 9 MG/ML
1000 INJECTION, SOLUTION INTRAVENOUS
Qty: 0 | Refills: 0 | Status: DISCONTINUED | OUTPATIENT
Start: 2019-05-08 | End: 2019-05-09

## 2019-05-08 RX ORDER — NALOXONE HYDROCHLORIDE 4 MG/.1ML
0.1 SPRAY NASAL
Qty: 0 | Refills: 0 | Status: DISCONTINUED | OUTPATIENT
Start: 2019-05-08 | End: 2019-05-09

## 2019-05-08 RX ORDER — OXYTOCIN 10 UNIT/ML
1 VIAL (ML) INJECTION
Qty: 30 | Refills: 0 | Status: DISCONTINUED | OUTPATIENT
Start: 2019-05-08 | End: 2019-05-08

## 2019-05-08 RX ORDER — ONDANSETRON 8 MG/1
4 TABLET, FILM COATED ORAL EVERY 6 HOURS
Qty: 0 | Refills: 0 | Status: DISCONTINUED | OUTPATIENT
Start: 2019-05-08 | End: 2019-05-08

## 2019-05-08 RX ORDER — SODIUM CHLORIDE 9 MG/ML
1000 INJECTION, SOLUTION INTRAVENOUS ONCE
Qty: 0 | Refills: 0 | Status: COMPLETED | OUTPATIENT
Start: 2019-05-08 | End: 2019-05-08

## 2019-05-08 RX ORDER — DEXAMETHASONE 0.5 MG/5ML
4 ELIXIR ORAL EVERY 6 HOURS
Qty: 0 | Refills: 0 | Status: DISCONTINUED | OUTPATIENT
Start: 2019-05-08 | End: 2019-05-09

## 2019-05-08 RX ORDER — DIPHENHYDRAMINE HCL 50 MG
25 CAPSULE ORAL EVERY 6 HOURS
Qty: 0 | Refills: 0 | Status: DISCONTINUED | OUTPATIENT
Start: 2019-05-08 | End: 2019-05-11

## 2019-05-08 RX ORDER — SIMETHICONE 80 MG/1
80 TABLET, CHEWABLE ORAL EVERY 4 HOURS
Qty: 0 | Refills: 0 | Status: DISCONTINUED | OUTPATIENT
Start: 2019-05-08 | End: 2019-05-11

## 2019-05-08 RX ORDER — NALOXONE HYDROCHLORIDE 4 MG/.1ML
0.1 SPRAY NASAL
Qty: 0 | Refills: 0 | Status: DISCONTINUED | OUTPATIENT
Start: 2019-05-08 | End: 2019-05-08

## 2019-05-08 RX ORDER — GLYCERIN ADULT
1 SUPPOSITORY, RECTAL RECTAL AT BEDTIME
Qty: 0 | Refills: 0 | Status: DISCONTINUED | OUTPATIENT
Start: 2019-05-08 | End: 2019-05-11

## 2019-05-08 RX ORDER — FAMOTIDINE 10 MG/ML
20 INJECTION INTRAVENOUS ONCE
Qty: 0 | Refills: 0 | Status: DISCONTINUED | OUTPATIENT
Start: 2019-05-08 | End: 2019-05-08

## 2019-05-08 RX ORDER — OXYTOCIN 10 UNIT/ML
333.33 VIAL (ML) INJECTION
Qty: 20 | Refills: 0 | Status: DISCONTINUED | OUTPATIENT
Start: 2019-05-08 | End: 2019-05-08

## 2019-05-08 RX ORDER — HYDROMORPHONE HYDROCHLORIDE 2 MG/ML
1 INJECTION INTRAMUSCULAR; INTRAVENOUS; SUBCUTANEOUS EVERY 6 HOURS
Qty: 0 | Refills: 0 | Status: DISCONTINUED | OUTPATIENT
Start: 2019-05-08 | End: 2019-05-09

## 2019-05-08 RX ORDER — HYDROMORPHONE HYDROCHLORIDE 2 MG/ML
1 INJECTION INTRAMUSCULAR; INTRAVENOUS; SUBCUTANEOUS
Qty: 0 | Refills: 0 | Status: DISCONTINUED | OUTPATIENT
Start: 2019-05-08 | End: 2019-05-09

## 2019-05-08 RX ORDER — CITRIC ACID/SODIUM CITRATE 300-500 MG
30 SOLUTION, ORAL ORAL ONCE
Qty: 0 | Refills: 0 | Status: COMPLETED | OUTPATIENT
Start: 2019-05-08 | End: 2019-05-08

## 2019-05-08 RX ORDER — MAGNESIUM HYDROXIDE 400 MG/1
30 TABLET, CHEWABLE ORAL
Qty: 0 | Refills: 0 | Status: DISCONTINUED | OUTPATIENT
Start: 2019-05-08 | End: 2019-05-11

## 2019-05-08 RX ORDER — ACETAMINOPHEN 500 MG
975 TABLET ORAL EVERY 6 HOURS
Qty: 0 | Refills: 0 | Status: DISCONTINUED | OUTPATIENT
Start: 2019-05-08 | End: 2019-05-11

## 2019-05-08 RX ORDER — HEPARIN SODIUM 5000 [USP'U]/ML
10000 INJECTION INTRAVENOUS; SUBCUTANEOUS EVERY 12 HOURS
Qty: 0 | Refills: 0 | Status: DISCONTINUED | OUTPATIENT
Start: 2019-05-08 | End: 2019-05-11

## 2019-05-08 RX ORDER — OXYCODONE HYDROCHLORIDE 5 MG/1
5 TABLET ORAL ONCE
Qty: 0 | Refills: 0 | Status: DISCONTINUED | OUTPATIENT
Start: 2019-05-08 | End: 2019-05-11

## 2019-05-08 RX ORDER — CITRIC ACID/SODIUM CITRATE 300-500 MG
30 SOLUTION, ORAL ORAL ONCE
Qty: 0 | Refills: 0 | Status: DISCONTINUED | OUTPATIENT
Start: 2019-05-08 | End: 2019-05-08

## 2019-05-08 RX ORDER — METOCLOPRAMIDE HCL 10 MG
10 TABLET ORAL ONCE
Qty: 0 | Refills: 0 | Status: DISCONTINUED | OUTPATIENT
Start: 2019-05-08 | End: 2019-05-08

## 2019-05-08 RX ORDER — IBUPROFEN 200 MG
600 TABLET ORAL EVERY 6 HOURS
Qty: 0 | Refills: 0 | Status: COMPLETED | OUTPATIENT
Start: 2019-05-08 | End: 2020-04-05

## 2019-05-08 RX ORDER — FAMOTIDINE 10 MG/ML
20 INJECTION INTRAVENOUS ONCE
Qty: 0 | Refills: 0 | Status: COMPLETED | OUTPATIENT
Start: 2019-05-08 | End: 2019-05-08

## 2019-05-08 RX ORDER — CEFAZOLIN SODIUM 1 G
2000 VIAL (EA) INJECTION EVERY 8 HOURS
Qty: 0 | Refills: 0 | Status: COMPLETED | OUTPATIENT
Start: 2019-05-08 | End: 2019-05-08

## 2019-05-08 RX ORDER — LANOLIN
1 OINTMENT (GRAM) TOPICAL EVERY 6 HOURS
Qty: 0 | Refills: 0 | Status: DISCONTINUED | OUTPATIENT
Start: 2019-05-08 | End: 2019-05-11

## 2019-05-08 RX ORDER — OXYTOCIN 10 UNIT/ML
333.33 VIAL (ML) INJECTION
Qty: 20 | Refills: 0 | Status: DISCONTINUED | OUTPATIENT
Start: 2019-05-08 | End: 2019-05-09

## 2019-05-08 RX ORDER — KETOROLAC TROMETHAMINE 30 MG/ML
30 SYRINGE (ML) INJECTION EVERY 6 HOURS
Qty: 0 | Refills: 0 | Status: DISCONTINUED | OUTPATIENT
Start: 2019-05-08 | End: 2019-05-09

## 2019-05-08 RX ORDER — OXYCODONE HYDROCHLORIDE 5 MG/1
5 TABLET ORAL
Qty: 0 | Refills: 0 | Status: DISCONTINUED | OUTPATIENT
Start: 2019-05-08 | End: 2019-05-09

## 2019-05-08 RX ORDER — CEFAZOLIN SODIUM 1 G
2000 VIAL (EA) INJECTION ONCE
Qty: 0 | Refills: 0 | Status: COMPLETED | OUTPATIENT
Start: 2019-05-08 | End: 2019-05-09

## 2019-05-08 RX ORDER — DOCUSATE SODIUM 100 MG
100 CAPSULE ORAL
Qty: 0 | Refills: 0 | Status: DISCONTINUED | OUTPATIENT
Start: 2019-05-08 | End: 2019-05-11

## 2019-05-08 RX ORDER — METOCLOPRAMIDE HCL 10 MG
10 TABLET ORAL ONCE
Qty: 0 | Refills: 0 | Status: COMPLETED | OUTPATIENT
Start: 2019-05-08 | End: 2019-05-08

## 2019-05-08 RX ADMIN — HYDROMORPHONE HYDROCHLORIDE 1 MILLIGRAM(S): 2 INJECTION INTRAMUSCULAR; INTRAVENOUS; SUBCUTANEOUS at 07:41

## 2019-05-08 RX ADMIN — Medication 975 MILLIGRAM(S): at 20:20

## 2019-05-08 RX ADMIN — Medication 100 MILLIGRAM(S): at 07:28

## 2019-05-08 RX ADMIN — Medication 30 MILLIGRAM(S): at 16:51

## 2019-05-08 RX ADMIN — Medication 10 MILLIGRAM(S): at 04:14

## 2019-05-08 RX ADMIN — Medication 30 MILLILITER(S): at 04:14

## 2019-05-08 RX ADMIN — HEPARIN SODIUM 10000 UNIT(S): 5000 INJECTION INTRAVENOUS; SUBCUTANEOUS at 15:21

## 2019-05-08 RX ADMIN — FAMOTIDINE 20 MILLIGRAM(S): 10 INJECTION INTRAVENOUS at 04:14

## 2019-05-08 RX ADMIN — Medication 30 MILLIGRAM(S): at 15:22

## 2019-05-08 RX ADMIN — Medication 100 MILLIGRAM(S): at 15:40

## 2019-05-08 RX ADMIN — Medication 975 MILLIGRAM(S): at 21:20

## 2019-05-08 RX ADMIN — Medication 1 MILLIUNIT(S)/MIN: at 01:39

## 2019-05-08 RX ADMIN — Medication 1000 MILLIUNIT(S)/MIN: at 08:58

## 2019-05-08 RX ADMIN — HYDROMORPHONE HYDROCHLORIDE 1 MILLIGRAM(S): 2 INJECTION INTRAMUSCULAR; INTRAVENOUS; SUBCUTANEOUS at 07:27

## 2019-05-08 RX ADMIN — SODIUM CHLORIDE 2000 MILLILITER(S): 9 INJECTION, SOLUTION INTRAVENOUS at 07:15

## 2019-05-08 NOTE — OB NEONATOLOGY/PEDIATRICIAN DELIVERY SUMMARY - NSPEDSNEONOTESA_OBGYN_ALL_OB_FT
39.2w M born via emergent c/s for NRFHT to a 29yo  mom. Maternal blood type O+. Mom with GDMA2 on insulin, PCOS and abnormal PAP. PNL neg, NR and immune. GBS neg. AROM clear fluid at 1515 (14hrs PTD).  code called due to decelerations. Baby born blue, limp with no respiratory effort. Brought to warmer, given PPV for about 2 minutes, then transitioned to CPAP 5, maximum FiO2 80%. No chest compressions required as heart rate above 100 at 1 minute of life. Apgars 4/8/8 (points off for resp, tone and color). EOS 0.18. NICU fellow and attending, Dr. Ferrari present during delivery.

## 2019-05-08 NOTE — DISCHARGE NOTE OB - PATIENT PORTAL LINK FT
You can access the [employer] [facility] Patient Portal, offered by [facility], by registering with the following website: [email or web address]
0 = independent

## 2019-05-08 NOTE — DISCHARGE NOTE OB - HOSPITAL COURSE
Patient had uncomplicated low transverse  section for NRFHT.  Please see operative note for details.  During postpartum course patient's vitals were stable, vaginal bleeding appropriate, and pain well controlled.  Post operation day one hematocrit was appropriate.  On day of discharge patient was ambulating, her pain controlled with oral medications, had adequate oral intake, and was voiding freely.  Discharge instructions and precautions were given.  Will return to clinic in 2 weeks for incision check.  Postpartum birth control plan is micronor->Nexplanon 6wk PP.

## 2019-05-08 NOTE — DISCHARGE NOTE OB - MEDICATION SUMMARY - MEDICATIONS TO STOP TAKING
I will STOP taking the medications listed below when I get home from the hospital:    Basaglar KwikPen 100 units/mL subcutaneous solution  -- 52 units SQ at bedtime    Basaglar KwikPen 100 units/mL subcutaneous solution  -- 22 units SQ in the am    Admelog 100 units/mL injectable solution  -- 8 units SQ in the morning and the afternoon    Admelog 100 units/mL injectable solution  -- 14 units SQ at dinner time

## 2019-05-08 NOTE — OB RN DELIVERY SUMMARY - NS_LABORCHARACTER_OBGYN_ALL_OB
Internal electronic FM/External electronic FM/Fetal intolerance/Induction of labor-Medicinal/Augmentation of labor/Induction of labor-AROM

## 2019-05-08 NOTE — CHART NOTE - NSCHARTNOTEFT_GEN_A_CORE
Note backtimed    Patient counseled regarding labor progress.   No cervical change over 5 hours, with recurrent decelerations. Pitocin was started at 1mU/min, with persistent recurrent decels.  Explained indication for  delivery for non-reassuring fetal tracing remote from delivery. Unable to titrate pitocin for adequate contractions due to decels with majority of contractions.    Patient counseled on indication for  delivery and risks associated with procedure. Discussed possible infection, injury to surrounding organs, bleeding requiring transfusion, or possible hysterectomy.  Patient verbalized understanding and wishes to proceed.    S Adam, R3

## 2019-05-08 NOTE — DISCHARGE NOTE OB - MEDICATION SUMMARY - MEDICATIONS TO TAKE
I will START or STAY ON the medications listed below when I get home from the hospital:    prenatal vitamins one tablet orally daily -- last dose 4-30-19  -- Indication: For Breastfeeding    acetaminophen 325 mg oral tablet  -- 3 tab(s) by mouth every 6 hours  -- Indication: For Pain    ibuprofen 600 mg oral tablet  -- 1 tab(s) by mouth every 6 hours  -- Indication: For Pain    oxyCODONE 5 mg oral tablet  -- 1 tab(s) by mouth every 6 hours MDD:4  -- Caution federal law prohibits the transfer of this drug to any person other  than the person for whom it was prescribed.  It is very important that you take or use this exactly as directed.  Do not skip doses or discontinue unless directed by your doctor.  May cause drowsiness.  Alcohol may intensify this effect.  Use care when operating dangerous machinery.  This prescription cannot be refilled.  Using more of this medication than prescribed may cause serious breathing problems.    -- Indication: For Pain    norethindrone 0.35 mg oral tablet  -- 1 tab(s) by mouth once a day MDD:1  -- Do not take this drug if you are pregnant.  It is very important that you take or use this exactly as directed.  Do not skip doses or discontinue unless directed by your doctor.    -- Indication: For Birth control

## 2019-05-08 NOTE — CHART NOTE - NSCHARTNOTEFT_GEN_A_CORE
Pre-op note.  Patient evaluated.  For  delivery for arrest.  Pitocin was discontinued for tracing issues.  .R/B/A informed.   All questions answered.  Patient and partner verbalized understanding and consented.

## 2019-05-08 NOTE — CHART NOTE - NSCHARTNOTEFT_GEN_A_CORE
R1 Note 05-08-19 @ 01:26    Pt was evaluated for progression.     VITALS:  T(C): 37.1 (05-08-19 @ 00:00), Max: 37.4 (05-07-19 @ 13:59)  HR: 97 (05-08-19 @ 01:18) (91 - 124)  BP: 138/75 (05-08-19 @ 01:08) (91/54 - 154/80)  RR: 18 (05-07-19 @ 13:59) (18 - 18)  SpO2: 98% (05-08-19 @ 01:23) (91% - 100%)    EFM: , mod lacey, +[id number], Late decel  [place]: Ctx Q4min  VE:6/100/-2      IMPRESSION:   FHR Category:   Additional:    PLAN:  Pitocin increase 1by1    Zev Lira PGY-1

## 2019-05-08 NOTE — CHART NOTE - NSCHARTNOTEFT_GEN_A_CORE
Patient requested more analgesia for painful contractions, now s/p top off.    Vital Signs Last 24 Hrs  T(C): 36.7 (08 May 2019 04:06), Max: 37.4 (07 May 2019 13:59)  T(F): 98.06 (08 May 2019 04:06), Max: 99.32 (07 May 2019 13:59)  HR: 107 (08 May 2019 04:13) (91 - 124)  BP: 122/80 (08 May 2019 02:55) (91/54 - 154/80)  BP(mean): --  RR: 18 (07 May 2019 13:59) (18 - 18)  SpO2: 99% (08 May 2019 04:13) (91% - 100%)    VE 8/100/-1, OP position with molding    EFM 135bpm/mod variability/+Accels/intermittent variable and late decels  [place] reg contractions q2-3mins    Patient is making cervical change with regular contractions; however, suspect cephalopelvic disproportion from pelvic exam & fetus is not engaged + significant molding. Category 2 tracing, with moderate variability and accelerations. Discussed these findings with patient and offered  delivery now for Cat2 tracing remote from delivery/ protracted labor vs expectant management with repeated evaluation of labor progress.     - will proceed with  delivery  - consents signed with witness present.    Dr. Caro at bedside  S Adam, R3

## 2019-05-08 NOTE — DISCHARGE NOTE OB - CARE PLAN
Principal Discharge DX:	 delivery delivered  Goal:	postop delivery  Assessment and plan of treatment:	After discharge, please stay on pelvic rest for 6 weeks, meaning no sexual intercourse, no tampons and no douching.  No driving for 2 weeks as women can loose a lot of blood during delivery and there is a possibility of being lightheaded/fainting.  No lifting objects heavier than baby for two weeks.  Expect to have vaginal bleeding/spotting for up to six weeks.  The bleeding should get lighter and more white/light brown with time.  For bleeding soaking more than a pad an hour or passing clots greater than the size of your fist, come in to the emergency department.    Follow up in clinic in 2 weeks for incision check.  Call clinic for noticeable increase in redness or swelling at incision, discharge from incision, or opening of skin at incision site.

## 2019-05-08 NOTE — DISCHARGE NOTE OB - ADDITIONAL INSTRUCTIONS
Please call for  follow up  postpartum visit within 2 weeks of delivery date,  at Ambulatory Clinic Unit : [facility]:  [facility], 3rd floor : phone # [phone] or walk-in hours are: MONDAY 3-6 pm, WEDNESDAY 3-6 pm, FRIDAY 9-11 am, 1-3 pm

## 2019-05-08 NOTE — DISCHARGE NOTE OB - PROVIDER TOKENS
FREE:[LAST:[[facility]],PHONE:[phone]],FAX:[(   [phone]    -],ADDRESS:[[facility]  Oncology Building  2nd Floor]]

## 2019-05-08 NOTE — DISCHARGE NOTE OB - PLAN OF CARE
After discharge, please stay on pelvic rest for 6 weeks, meaning no sexual intercourse, no tampons and no douching.  No driving for 2 weeks as women can loose a lot of blood during delivery and there is a possibility of being lightheaded/fainting.  No lifting objects heavier than baby for two weeks.  Expect to have vaginal bleeding/spotting for up to six weeks.  The bleeding should get lighter and more white/light brown with time.  For bleeding soaking more than a pad an hour or passing clots greater than the size of your fist, come in to the emergency department.    Follow up in clinic in 2 weeks for incision check.  Call clinic for noticeable increase in redness or swelling at incision, discharge from incision, or opening of skin at incision site. postop delivery

## 2019-05-08 NOTE — OB PROVIDER DELIVERY SUMMARY - [email or web address]_[email or web address]_ALL_OB_FT
Protracted labor and Category 2 tracing remote from delivery. Bradycardia noted in OR, decision made to proceed with emergent  delivery.   Delivery of viable male infant Apgars 4/8/8, placenta intact, grossly normal appearing bilateral fallopian tubes and ovaries. Interceed placed on hysterotomy Protracted labor and Category 2 tracing remote from delivery. Bradycardia noted in OR, decision made to proceed with emergent  delivery.   Delivery of viable male infant Apgars 4/8/8, placenta intact, grossly normal appearing bilateral fallopian tubes and ovaries. Interceed placed on hysterotomy  EBL 1000cc  IVF 2000cc  UOP 150cc

## 2019-05-08 NOTE — OB RN INTRAOPERATIVE NOTE - NS_URGENCYSURGERY_OBGYN_ALL_OB
Inpatient Rehabilitation - Physical Therapy Treatment Note/Discharge  [facility]     Patient Name: Vangie Lopez  : 1952  MRN: [id number]  Today's Date: 2017  Onset of Illness/Injury or Date of Surgery Date: 17  Date of Referral to PT: 17  Referring Physician: Dr. Stephen    Admit Date: 2017    Visit Dx:    ICD-10-CM ICD-9-CM   1. Multiple trauma T07 959.8   2. Abnormality of gait and mobility R26.9 781.2   3. Muscle weakness (generalized) M62.81 728.87   4. Impaired mobility and ADLs Z74.09 799.89   5. Closed displaced comminuted fracture of right patella with routine healing, subsequent encounter S82.041D V54.16   6. Closed displaced fracture of navicular bone of left foot with routine healing, subsequent encounter S92.252D V54.19     Patient Active Problem List   Diagnosis   • Encounter for screening for malignant neoplasm of colon   • Closed displaced comminuted fracture of right patella   • Closed displaced fracture of navicular bone of left foot   • Closed dislocation of navicular bone of left foot   • Patella fracture   • Essential hypertension   • Mixed hyperlipidemia   • Osteoarthritis of hands, bilateral   • BPH (benign prostatic hyperplasia)   • Glaucoma   • Hyperlipidemia   • Closed fracture of navicular bone with dislocation of perilunate joint of left wrist   • Fracture of patella, right, closed   • Multiple trauma   • Encounter for rehabilitation   • History of kidney stones   • Recent onset of diabetes mellitus       Physical Therapy Education     Title: PT OT SLP Therapies (Active)     Topic: Physical Therapy (Done)     Point: Mobility training (Done)    Learning Progress Summary    Learner Readiness Method Response Comment Documented by Status   Patient Acceptance E VU reviewed  non wt bearing and transfering to from wc RW 17 1140 Done    Acceptance E NR Reviewed transferring towards stronger side and maintaining NWB with activity. LM 17 5151  Active    Acceptance E NR  TA 09/12/17 1515 Active    Acceptance E NR Reviewed safety with transfers.  Explained that Dr. Rutherford only wants stretcher chair used at this time.  09/11/17 1636 Active               Point: Home exercise program (Done)    Learning Progress Summary    Learner Readiness Method Response Comment Documented by Status   Patient Acceptance E,TB,D VU Reveiwed HEP, transfer safety, & benefits of mobility/ROM, precautions for D/C home today  09/22/17 1035 Done    Acceptance E VU provided picture HEP  09/20/17 1256 Done               Point: Precautions (Done)    Learning Progress Summary    Learner Readiness Method Response Comment Documented by Status   Patient Acceptance E,TB,D VU Reveiwed HEP, transfer safety, & benefits of mobility/ROM, precautions for D/C home today  09/22/17 1035 Done    Acceptance E VU reviewed  non wt bearing and transfering to from [facility] 09/13/17 1140 Done    Acceptance E NR Reviewed transferring towards stronger side and maintaining NWB with activity.  09/12/17 1608 Active    Acceptance E NR  TA 09/12/17 1515 Active    Acceptance E NR Reviewed safety with transfers.  Explained that Dr. Rutherford only wants stretcher chair used at this time.  09/11/17 1636 Active                      User Key     Initials Effective Dates Name Provider Type Discipline     06/15/16 -  Isela Chris, PT Physical Therapist PT     10/17/16 -  Dwayne Felton PTA Physical Therapy Assistant PT    TA 10/17/16 -  Geri Juan PTA Physical Therapy Assistant PT     10/17/16 -  Balaji Malhotra PTA Physical Therapy Assistant PT                    IP PT Goals       09/22/17 1000 09/21/17 1430 09/20/17 1424    Range of Motion PT LTG    Range of Motion Goal PT LTG, Date Goal Reviewed 09/22/17  -JA 09/21/17  -JA 09/20/17  -RW    Range of Motion Goal PT LTG, Outcome goal met  -ERICH goal ongoing  -JA goal ongoing  -RW      09/19/17 1443 09/18/17 1445 09/18/17 1444    Transfer Training PT LTG     Emergent Transfer Training PT  LTG, Date Goal Reviewed  09/18/17  -RW     Transfer Training PT LTG, Outcome  goal met  -RW     Range of Motion PT LTG    Range of Motion Goal PT LTG, Date Goal Reviewed 09/19/17  -RW  09/18/17  -RW    Range of Motion Goal PT LTG, Outcome goal ongoing  -RW  goal ongoing  -RW      09/16/17 1255 09/15/17 1556 09/14/17 1557    Bed Mobility PT LTG    Bed Mobility PT LTG, Date Goal Reviewed   09/14/17  -RW    Bed Mobility PT LTG, Outcome   goal met  -RW    Transfer Training PT LTG    Transfer Training PT  LTG, Date Goal Reviewed 09/16/17  -JA 09/15/17  -RW 09/14/17  -RW    Transfer Training PT LTG, Outcome goal ongoing  -JA goal ongoing  -RW goal ongoing  -RW    Range of Motion PT LTG    Range of Motion Goal PT LTG, Date Goal Reviewed 09/16/17  -JA 09/15/17  -RW 09/14/17  -RW    Range of Motion Goal PT LTG, Outcome goal ongoing  -JA goal ongoing  -RW goal ongoing  -RW    Wheelchair Propulsion PT LTG    Wheelchair Propulsion Goal PT LTG, Date Goal Reviewed   09/14/17  -RW    Wheelchair Propulsion Goal PT LTG, Outcome   goal met  -RW    Physical Therapy PT LTG    Physical Therapy PT LTG, Date Goal Reviewed   09/14/17  -RW    Physical Therapy PT LTG, Outcome   goal met  -RW      09/13/17 1533 09/12/17 1355 09/11/17 1500    Bed Mobility PT LTG    Bed Mobility PT LTG, Date Established   09/11/17  -LM    Bed Mobility PT LTG, Time to Achieve   by discharge  -LM    Bed Mobility PT LTG, Activity Type   supine to sit/sit to supine  -LM    Bed Mobility PT LTG, [place] Level   conditional independence  -LM    Bed Mobility PT Goal  LTG, Assist Device   overhead trapeze;bed rails  -LM    Bed Mobility PT LTG, Date Goal Reviewed 09/13/17  -RW 09/12/17  -LM     Bed Mobility PT LTG, Outcome goal ongoing  -RW goal ongoing  -LM goal ongoing  -LM    Transfer Training PT LTG    Transfer Training PT LTG, Date Established   09/11/17  -LM    Transfer Training PT LTG, Time to Achieve   by discharge  -LM    Transfer  Training PT LTG, Activity Type   bed to chair /chair to bed  -LM    Transfer Training PT LTG, [place] Level   conditional independence  -LM    Transfer Training PT LTG, Assist Device   --   AAD  -LM    Transfer Training PT  LTG, Date Goal Reviewed 09/13/17  -RW 09/12/17  -LM     Transfer Training PT LTG, Outcome goal ongoing  -RW goal ongoing  -LM goal ongoing  -LM    Range of Motion PT LTG    Range of Motion Goal PT LTG, Date Established   09/11/17  -LM    Range of Motion Goal PT LTG, Time to Achieve   by discharge  -LM    Range fo Motion Goal PT LTG, Joint   L knee  -LM    Range of Motion Goal PT LTG, AROM Measure   Knee flex - 110 degrees  -LM    Range of Motion Goal PT LTG, Date Goal Reviewed 09/13/17  -RW 09/12/17  -LM     Range of Motion Goal PT LTG, Outcome goal ongoing  -RW goal ongoing  -LM goal ongoing  -LM    Wheelchair Propulsion PT LTG    Wheelchair Propulsion Goal PT LTG, Date Established  09/12/17  -LM     Wheelchair Propulsion Goal PT LTG, Time to Achieve  by discharge  -LM     Wheelchair Propulsion Goal PT LTG, [place] Level  conditional independence  -LM     Wheelchair Propulsion Goal PT LTG, Distance to Achieve  300 feet  -LM     Wheelchair Propulsion Goal PT LTG, Date Goal Reviewed 09/13/17  -RW      Wheelchair Propulsion Goal PT LTG, Outcome goal ongoing  -RW goal ongoing  -LM     Physical Therapy PT LTG    Physical Therapy PT LTG, Date Established  09/12/17  -LM     Physical Therapy PT LTG, Time to Achieve  by discharge  -LM     Physical Therapy PT LTG, Activity Type  Pt will self propel w/c up/down ramp.  -LM     Physical Therapy PT LTG, [place] Level  conditional independence  -LM     Physical Therapy PT LTG, Date Goal Reviewed 09/13/17  -RW      Physical Therapy PT LTG, Outcome goal ongoing  -RW goal ongoing  -LM       09/11/17 1040 09/10/17 1300 09/09/17 1626    Transfer Training PT LTG    Transfer Training PT LTG, Date Established   09/09/17  -MAGNO    Transfer Training PT LTG,  Time to Achieve   by discharge  -    Transfer Training PT LTG, Activity Type   bed to chair /chair to bed  -    Transfer Training PT LTG, Additional Goal   Once MD allows, pt will perform lateral transfer with conditional independence  -    Transfer Training PT  LTG, Date Goal Reviewed 09/11/17  -AM 09/10/17  -A     Transfer Training PT LTG, Outcome goal not met  -AM goal ongoing  -A goal ongoing  -    Transfer Training PT LTG, Reason Goal Not Met discharged from facility  -AM      Patient Education PT LTG    Patient Education PT LTG, Date Established   09/09/17  -    Patient Education PT LTG, Time to Achieve   by discharge  -    Patient Education PT LTG, Education Type   precaution per surgeon;transfers;bed mobility;pain management;home safety  -    Patient Education PT LTG, Date Goal Reviewed 09/11/17  -AM 09/10/17  -[facility]     Patient Education PT LTG Outcome goal met  -AM goal ongoing  -A goal ongoing  -    Caregiver Training PT LTG    Caregiver Training PT LTG, Date Established   09/09/17  -    Caregiver Training PT LTG, Time to Achieve   by discharge  -    Caregiver Training PT LTG, Activity Type   home caregiver will demonstrate understanding assisting pt as needed with transfers/mobility as well as verbalize understanding of home care instructions  -    Caregiver Training PT LTG, Date Goal Reviewed 09/11/17  -AM 09/10/17  -[facility]     Caregiver Training PT LTG, Outcome goal not met  -AM goal ongoing  -A goal ongoing  -    Caregiver Training PT LTG, Reason Goal Not Met discharged from facility  -AM      Physical Therapy PT STG    Physical Therapy PT STG, Date Established   09/09/17  -    Physical Therapy PT STG, Time to Achieve   by discharge  -    Physical Therapy PT STG, Activity Type   Pt will tolerate OOB 3-4 hours per day  -    Physical Therapy PT STG, Date Goal Reviewed  09/10/17  -[facility]     Physical Therapy PT STG, Outcome  goal met  -A goal ongoing  -      User Key   (r) = Recorded By, (t) = Taken By, (c) = Cosigned By    Initials Name Provider Type    LM Isela Chris, PT Physical Therapist    MAGNO Abel, PT Physical Therapist    ERICH Felton, DARIEN Physical Therapy Assistant    HELEN Pendleton, PTA Physical Therapy Assistant    SUSIE Young, PTA Physical Therapy Assistant    RW Balaji Malhotra, PTA Physical Therapy Assistant              Adult Rehabilitation Note       09/22/17 1000 09/21/17 1430 09/21/17 1330    Rehab Assessment/Intervention    Discipline physical therapy assistant  -ERICH physical therapy assistant  -ERICH occupational therapy assistant  -LM    Document Type therapy note (daily note)  -ERICH therapy note (daily note)  -ERICH therapy note (daily note)  -LM    Subjective Information agree to therapy  -ERICH agree to therapy  -ERICH agree to therapy  -LM    Patient Effort, Rehab Treatment good  -JA good  -JA good  -LM    Precautions/Limitations non-weight bearing status  -JA non-weight bearing status  -ERICH non-weight bearing status  -LM    Recorded by [ERICH] Dwayne Felton PTA [JA] Dwayne Felton PTA [LM] Marce Carter, VILLALPANDO/L    Pain Assessment    Pain Assessment No/denies pain  -ERICH 0-10  -ERICH No/denies pain  -LM    Pain Score  6  -JA     Post Pain Score  6  -JA 0  -LM    Pain Type   Acute pain  -LM    Pain Location  Ankle  -JA     Pain Orientation  Left  -JA     Pain Intervention(s) Medication (See MAR)  -ERICH Medication (See MAR)  -JA     Recorded by [ERICH] Dwayne Felton PTA [JA] Dwayne Felton PTA [LM] Marce Carter, VILLAL[place]ANDO/L    General LE Assessment    ROM knee, left: LE ROM deficit  -JA      ROM Detail 112arom   -JA      Recorded by [ERICH] Dwayne Felton PTA      Mobility Assessment/Training    Extremity Weight-Bearing Status left lower extremity;right lower extremity  -ERICH left lower extremity;right lower extremity  -JA     Left Lower Extremity Weight-Bearing non weight-bearing  -JA non weight-bearing  -JA     Right Lower  Extremity Weight-Bearing non weight-bearing  -JA non weight-bearing  -JA     Recorded by [JA] Dwayne Felton PTA [JA] Dwayne Felton PTA     Bed Mobility, Assessment/Treatment    Bed Mobility, Assistive Device  head of bed elevated  -JA     Bed Mobility, Scoot/Bridge, [place]  conditional independence  -JA     Bed Mob, Supine to Sit, [place]  independent  -JA     Bed Mob, Sit to Supine, [place]  independent  -JA     Recorded by  [JA] Dwayne Felton PTA     Transfer Assessment/Treatment    Transfers, Bed-Chair [place]  conditional independence  -JA     Transfers, Chair-Bed [place]  conditional independence  -JA     Transfers, Bed-Chair-Bed, Assist Device  sliding board  -JA     Recorded by  [JA] Dwayne Felton PTA     Wheelchair Training/Management    Wheelchair, Distance Propelled  150 conditional independent   -JA     Recorded by  [JA] Dwayne Felton PTA     Therapy Exercises    Right Lower Extremity  AROM:;20 reps;supine;ankle pumps/circles   2x20  -JA     Left Lower Extremity AROM:;20 reps;sitting;knee flexion  -JA AAROM:;20 reps;supine;heel slides;AROM:;hip abduction/adduction;SLR   2x20  -JA     Bilateral Lower Extremities  AROM:;20 reps;supine;glut sets;quad sets   2x20  -JA     Bilateral Upper Extremity  AROM:;20 reps;sitting;15 reps;elbow flexion/extension   mid/high rows, lat pulldows 35#/ 15# bicep curl/tricep ext.  -JA AROM:;20 reps;elbow flexion/extension;pronation/supination;shoulder abduction/adduction;shoulder extension/flexion;shoulder horizontal abd/add;shoulder protraction/retraction;shoulder rolls/shrugs   3 sets of 20 reps all ex with 3lb wt   -LM    Recorded by [JA] Dwayne Felton PTA [JA] Dwayne Felton PTA [LM] Marce Carter, VILLALPANDO/L    Positioning and Restraints    Pre-Treatment Position sitting in chair/recliner  -JA in bed  -JA in bed  -LM    Post Treatment Position wheelchair  -JA bed  -JA bed  -LM    Recorded by [ERICH] Dwayne  MIER Felton PTA [JA] Dwayne Felton PTA [LM] NAV SmithA/L      09/21/17 1015 09/21/17 0830 09/20/17 1300    Rehab Assessment/Intervention    Discipline physical therapy assistant  -ERICH occupational therapy assistant  -LM physical therapy assistant  -RW    Document Type therapy note (daily note)  -JA therapy note (daily note)  -LM therapy note (daily note)  -RW    Subjective Information agree to therapy  -JA agree to therapy  -LM agree to therapy  -RW    Patient Effort, Rehab Treatment good  -JA good  -LM good  -RW    Precautions/Limitations non-weight bearing status  -JA      Recorded by [ERICH] Dwayne Felton PTA [LM] Marce Carter, VILLALPANDO/L [RW] Balaji Malhotra PTA    Pain Assessment    Pain Assessment 0-10  -JA      Pain Score 5  -JA  --   gives no rating  -RW    Post Pain Score 5  -JA      Pain Type Acute pain  -JA      Pain Location Ankle  -JA  Ankle  -RW    Pain Orientation Left  -JA  Left  -RW    Pain Intervention(s) Medication (See MAR)  -JA Medication (See MAR)  -LM     Recorded by [JA] Dwayne Felton PTA [LM] NAV SmithA/L [RW] Balaji Malhotra PTA    Cognitive Assessment/Intervention    Current Cognitive/Communication Assessment   functional  -RW    Orientation Status   oriented x 4  -RW    Follows Commands/Answers Questions   100% of the time  -RW    Personal Safety Interventions   gait belt  -RW    Recorded by   [RW] Balaji Malhotra PTA    General LE Assessment    ROM knee, left: LE ROM deficit  -JA      ROM Detail 96arom  -JA      Recorded by [JA] Dwayne Felton PTA      Mobility Assessment/Training    Left Lower Extremity Weight-Bearing non weight-bearing  -JA  non weight-bearing  -RW    Right Lower Extremity Weight-Bearing non weight-bearing  -JA  non weight-bearing  -RW    Recorded by [JA] Dwayne Felton PTA  [RW] Balaji Malhotra PTA    Bed Mobility, Assessment/Treatment    Bed Mob, Supine to Sit, [place]  independent  -LM     Bed Mob, Sit to  Supine, [place]  independent  -LM     Recorded by  [LM] NAV SmithA/L     Transfer Assessment/Treatment    Transfers, Chair-Bed [place]  conditional independence  -LM     Transfers, Sit-Stand [place]   not appropriate to assess  -RW    Recorded by  [LM] IRASEMA Smith/L [RW] Balaji Malhotra PTA    Gait Assessment/Treatment    Gait, [place] Level   not appropriate to assess  -RW    Recorded by   [RW] Balaji Malhotra PTA    Therapy Exercises    Right Lower Extremity AROM:;20 reps;supine;ankle pumps/circles   2x20  -JA      Left Lower Extremity AROM:;20 reps;supine;hip abduction/adduction;SLR;heel slides   2x20  -JA      Bilateral Lower Extremities AROM:;20 reps;supine;glut sets;quad sets   2x20  -JA      BLE Resistance  manual resistance- minimal   level 4 tband all planes 2-3 sets of 20 reps   -LM     Bilateral Upper Extremity  AROM:   rickshaw x 3 sets of 20 reps   -LM     Recorded by [JA] Dwayne Felton PTA [LM] IRASEMA Smith/L     Positioning and Restraints    Pre-Treatment Position in bed  -JA sitting in chair/recliner  -LM     Post Treatment Position bed  -JA bed  -LM     Recorded by [JA] Dwayne Felton PTA [LM] IRASEMA Smith/FREEDOM       09/20/17 1027 09/20/17 0730 09/19/17 1445    Rehab Assessment/Intervention    Discipline physical therapy assistant  -RW occupational therapy assistant  -KD occupational therapy assistant  -LM    Document Type therapy note (daily note)  -RW therapy note (daily note)  -KD therapy note (daily note)  -LM    Subjective Information agree to therapy  -RW agree to therapy  -KD agree to therapy  -LM    Patient Effort, Rehab Treatment good  -RW  good  -LM    Recorded by [RW] Balaji Malhotra PTA [KD] NAV CamachoA/FREEDOM [LM] IRASEMA Smith/L    Vital Signs    Pre Systolic BP Rehab  147  -KD     Pre Treatment Diastolic BP  80  -KD     Pretreatment Heart Rate (beats/min)  83  -KD     Posttreatment Heart Rate  (beats/min)  90  -KD     Pre SpO2 (%)  96  -KD     O2 Delivery Pre Treatment  room air  -KD     Post SpO2 (%)  98  -KD     O2 Delivery Post Treatment  room air  -KD     Pre Patient Position  Supine  -KD     Intra Patient Position  Sitting  -KD     Post Patient Position  Sitting  -KD     Recorded by  [KD] Virgie Ray, VILLALPANDO/L     Pain Assessment    Pain Assessment No/denies pain  -RW No/denies pain  -KD No/denies pain  -LM    Recorded by [RW] Balaji Malhotra PTA [KD] Virgie Ray VILLALPANDO/L [LM] Marce Carter, VILLALPANDO/L    Cognitive Assessment/Intervention    Current Cognitive/Communication Assessment functional  -RW functional  -KD functional  -LM    Orientation Status oriented x 4  -RW oriented x 4  -KD oriented x 4  -LM    Follows Commands/Answers Questions 100% of the time  -% of the time  -% of the time  -LM    Personal Safety  WNL/WFL  -KD     Personal Safety Interventions gait belt  -RW gait belt  -KD     Recorded by [RW] Balaji Malhotra PTA [KD] Virgie Ray, VILLALPANDO/L [LM] Marce Carter, VILLAL[place]ANDO/L    Mobility Assessment/Training    Left Lower Extremity Weight-Bearing non weight-bearing  -RW      Right Lower Extremity Weight-Bearing non weight-bearing  -RW      Recorded by [RW] Balaji Malhotra PTA      Bed Mobility, Assessment/Treatment    Bed Mobility, Roll Left, [place]  conditional independence  -KD     Bed Mobility, Scoot/Bridge, [place]  conditional independence  -KD     Bed Mob, Supine to Sit, [place] independent  -RW independent  -KD     Bed Mob, Sit to Supine, [place] independent  -RW      Bed Mob, Sidelying to Sit, [place]  independent  -KD     Recorded by [RW] Balaji Malhotra PTA [KD] Virgie Ray, VILLALPANDO/L     Transfer Assessment/Treatment    Transfers, Bed-Chair [place] conditional independence  -RW conditional independence  -KD     Transfers, Chair-Bed [place] conditional independence  -RW  conditional independence  -LM    Transfers, Sit-Stand  [place] not appropriate to assess  -RW      Toilet Transfer, [place]  conditional independence  -KD     Toilet Transfer, Assistive Device  bedside commode with drop arms  -KD     Recorded by [RW] Balaji Malhotra, PTA [KD] NAV CamachoA/FREEDOM [LM] NAV SmithA/L    Gait Assessment/Treatment    Gait, [place] Level not appropriate to assess  -RW      Recorded by [RW] Balaji Malhotra PTA      Wheelchair Training/Management    Wheelchair Propulsion Training  forward propulsion  -KD     Wheelchair, Distance Propelled 130 mod ind  - mod indep  -KD     Recorded by [RW] Balaji Malhotra, PTA [KD] Virgie Ray VILLALPANDO/L     Upper Body Dressing Assessment/Training    UB Dressing Assess/Train, Clothing Type  donning:;t-shirt  -KD     UB Dressing Assess/Train, Position  sitting  -KD     UB Dressing Assess/Train, [place]  independent  -KD     Recorded by  [KD] Virgie Ray VILLALPANDO/L     Lower Body Dressing Assessment/Training    LB Dressing Assess/Train, Clothing Type  doffing:;donning:;shorts  -KD     LB Dressing Assess/Train, Position  sitting  -KD     LB Dressing Assess/Train, [place]  set up required  -KD     LB Dressing Assess/Train, Impairments  strength decreased  -KD     LB Dressing Assess/Train, Comment  Min A for doffing  -KD     Recorded by  [KD] NAV CamachoA/L     Toileting Assessment/Training    Toileting Assess/Train, Assistive Device  bedside commode  -KD     Toileting Assess/Train, Position  sitting  -KD     Toileting Assess/Train, [place] Level  set up required  -KD     Recorded by  [KD] Virgie Ray VILLALPANDO/L     Balance Skills Training    Sitting-Balance Activities Reaching for weighted objects;Reaching for objects;Reaching across midline  -RW      Recorded by [RW] Balaji Malhotra PTA      Therapy Exercises    Right Lower Extremity AROM:;20 reps;quad sets;sitting   2/20  -RW      Left Lower Extremity AROM:;20 reps;SLR;sitting   2/20  -RW      Bilateral Upper  Extremity 15 reps;AROM:;sitting;elbow flexion/extension;shoulder abduction/adduction;shoulder horizontal abd/add   rows and pulldowns  -RW AROM:;20 reps;sitting;elbow flexion/extension;hand pumps;pronation/supination;shoulder abduction/adduction;shoulder extension/flexion;shoulder ER/IR;shoulder horizontal abd/add  -KD AROM:   lev 4 tband all UE planes increase strenvth 2-3 sets of 20   -LM    BUE Resistance --   perstack x 10-35 #  -RW --   UEE x 15 mins; CC 5 sets x 20 reps 20 lbs; RS 5 sets x 20 re  -KD     Recorded by [RW] Balaji Malhotra PTA [KD] NAV CamachoA/L [LM] NAV SmithA/L    Positioning and Restraints    Pre-Treatment Position in bed  -RW in bed  -KD sitting in chair/recliner  -LM    Post Treatment Position bed  -RW bsc  -KD bed  -LM    In Bed call light within reach  -RW      On BS commode  sitting;call light within reach;encouraged to call for assist  -KD     Recorded by [RW] Balaji Malhotra PTA [KD] NAV CamachoA/FREEDOM [LM] NAV SmithA/FREEDOM      09/19/17 1350          Rehab Assessment/Intervention    Discipline physical therapy assistant  -RW      Document Type therapy note (daily note)  -RW      Subjective Information agree to therapy  -RW      Patient Effort, Rehab Treatment good  -RW      Recorded by [RW] Balaji Malhotra PTA      Pain Assessment    Pain Assessment No/denies pain  -RW      Recorded by [RW] Balaji Malhotra PTA      Cognitive Assessment/Intervention    Current Cognitive/Communication Assessment functional  -RW      Orientation Status oriented x 4  -RW      Follows Commands/Answers Questions 100% of the time  -RW      Recorded by [RW] Balaji Malhotra PTA      Mobility Assessment/Training    Left Lower Extremity Weight-Bearing non weight-bearing  -RW      Right Lower Extremity Weight-Bearing non weight-bearing  -RW      Recorded by [RW] Balaji Malhotra PTA      Bed Mobility, Assessment/Treatment    Bed Mob, Supine to Sit, [place] independent  -RW       Bed Mob, Sit to Supine, [place] independent  -RW      Recorded by [RW] Balaji Malhotra PTA      Transfer Assessment/Treatment    Transfers, Bed-Chair [place] conditional independence  -RW      Transfers, Sit-Stand [place] not appropriate to assess  -RW      Recorded by [RW] Balaji Malhotra PTA      Gait Assessment/Treatment    Gait, [place] Level not appropriate to assess  -RW      Recorded by [RW] Balaji Malhotra PTA      Wheelchair Training/Management    Wheelchair, Distance Propelled 150 mod ind  -RW      Recorded by [RW] Balaji Malhotra PTA      Balance Skills Training    Sitting-Balance Activities Reaching for weighted objects;Reaching for objects;Reaching across midline  -RW      Recorded by [RW] Balaji Malhotra PTA      Therapy Exercises    Right Lower Extremity AROM:;20 reps;quad sets;sitting   2/20  -RW      Left Lower Extremity AROM:;20 reps;SLR;sitting   2/20  -RW      Recorded by [RW] Balaji Malhotra PTA        User Key  (r) = Recorded By, (t) = Taken By, (c) = Cosigned By    Initials Name Effective Dates    EIRCH Felton, [facility] 10/17/16 -     RW Balaji Malhotra PTA 10/17/16 -     KD Virgie Ray, [facility]/L 10/17/16 -     LM Marce Carter, [place]/L 10/17/16 -           PT Recommendation and Plan  Anticipated Discharge Disposition: home with home health, home with 24/7 care  Planned Therapy Interventions: balance training, bed mobility training, home exercise program, motor coordination training, neuromuscular re-education, patient/family education, postural re-education, ROM (Range of Motion), strengthening, stretching, transfer training  PT Frequency: other (see comments) (5-14 times/wk)  Plan of Care Review  Plan Of Care Reviewed With: patient  Progress: progress toward functional goals as expected  Outcome Summary/Follow up Plan: Pt. met all goals prior to D/C home with PT & 24/7 care           Outcome Measures       09/22/17 1000 09/21/17 1015 09/20/17 1100    How much  help from another person do you currently need...    Turning from your back to your side while in flat bed without using bedrails? 4  -JA 4  -JA 4  -RW    Moving from lying on back to sitting on the side of a flat bed without bedrails? 4  -JA 4  -JA 4  -RW    Moving to and from a bed to a chair (including a wheelchair)? 4  -JA 4  -JA 4  -RW    Standing up from a chair using your arms (e.g., wheelchair, bedside chair)? 1  -JA 1  -JA 1  -RW    Climbing 3-5 steps with a railing? 1  -JA 1  -JA 1  -RW    To walk in hospital room? 1  -JA 1  -JA 1  -RW    AM-PAC 6 Clicks Score 15  -JA 15  -JA 15  -RW    Functional Assessment    Outcome Measure Options AM-PAC 6 Clicks Basic Mobility (PT)  - AM-PAC 6 Clicks Basic Mobility (PT)  -       09/20/17 0730          How much help from another is currently needed...    Putting on and taking off regular lower body clothing? 3  -KD      Bathing (including washing, rinsing, and drying) 3  -KD      Toileting (which includes using toilet bed pan or urinal) 3  -KD      Putting on and taking off regular upper body clothing 4  -KD      Taking care of personal grooming (such as brushing teeth) 4  -KD      Eating meals 4  -KD      Score 21  -KD        User Key  (r) = Recorded By, (t) = Taken By, (c) = Cosigned By    Initials Name Provider Type    ERICH Felton PTA Physical Therapy Assistant    JOHN Malhotra PTA Physical Therapy Assistant    IRASEMA Shelby/L Occupational Therapy Assistant           Time Calculation:         PT Charges       09/22/17 1034          Time Calculation    Start Time 1000  -      Stop Time 1030  -ERICH      Time Calculation (min) 30 min  -      Time Calculation- PT    Total Timed Code Minutes- PT 30 minute(s)  -        User Key  (r) = Recorded By, (t) = Taken By, (c) = Cosigned By    Initials Name Provider Type    ERICH Felton PTA Physical Therapy Assistant          Therapy Charges for Today     Code Description Service Date  Service Provider Modifiers Qty    48049374596 HC PT THER PROC EA 15 MIN 9/21/2017 Dwayne Felton PTA GP 2    97865769428 HC PT THERAPEUTIC ACT EA 15 MIN 9/21/2017 Dwayne Felton PTA GP 1    26296788310 HC PT THER PROC EA 15 MIN 9/21/2017 Dwayne Felton PTA GP 3    58415507635 HC PT SELF CARE/MGMT/TRAIN EA 15 MIN 9/22/2017 Dwayne Felton PTA GP 2          PT G-Codes  PT Professional Judgement Used?: Yes  Outcome Measure Options: AM-PAC 6 Clicks Basic Mobility (PT)  Score: 12  Functional Limitation: Mobility: Walking and moving around  Mobility: Walking and Moving Around Current Status (): At least 60 percent but less than 80 percent impaired, limited or restricted  Mobility: Walking and Moving Around Goal Status (): At least 20 percent but less than 40 percent impaired, limited or restricted    PT Discharge Summary  Anticipated Discharge Disposition: home with home health, home with 24/7 care  Reason for Discharge: All goals achieved  Outcomes Achieved: Able to achieve all goals within established timeline  Discharge Destination: Home with home health, Home with assist    Dwayne Felton PTA  9/22/2017

## 2019-05-08 NOTE — DISCHARGE NOTE OB - CARE PROVIDER_API CALL
[facility],   [facility]  Oncology Building  2nd Floor  Phone: [phone]  Fax: (   )    -  Follow Up Time:

## 2019-05-09 DIAGNOSIS — O24.414 GESTATIONAL DIABETES MELLITUS IN PREGNANCY, INSULIN CONTROLLED: ICD-10-CM

## 2019-05-09 DIAGNOSIS — O99.213 OBESITY COMPLICATING PREGNANCY, THIRD TRIMESTER: ICD-10-CM

## 2019-05-09 DIAGNOSIS — Z3A.38 38 WEEKS GESTATION OF PREGNANCY: ICD-10-CM

## 2019-05-09 PROBLEM — Z34.90 ENCOUNTER FOR SUPERVISION OF NORMAL PREGNANCY, UNSPECIFIED, UNSPECIFIED TRIMESTER: Chronic | Status: ACTIVE | Noted: 2019-05-01

## 2019-05-09 LAB
BASOPHILS # BLD AUTO: 0.02 K/UL — SIGNIFICANT CHANGE UP (ref 0–0.2)
BASOPHILS NFR BLD AUTO: 0.2 % — SIGNIFICANT CHANGE UP (ref 0–2)
EOSINOPHIL # BLD AUTO: 0.05 K/UL — SIGNIFICANT CHANGE UP (ref 0–0.5)
EOSINOPHIL NFR BLD AUTO: 0.4 % — SIGNIFICANT CHANGE UP (ref 0–6)
HCT VFR BLD CALC: 32.2 % — LOW (ref 34.5–45)
HGB BLD-MCNC: 10.4 G/DL — LOW (ref 11.5–15.5)
IMM GRANULOCYTES NFR BLD AUTO: 0.6 % — SIGNIFICANT CHANGE UP (ref 0–1.5)
LYMPHOCYTES # BLD AUTO: 1.8 K/UL — SIGNIFICANT CHANGE UP (ref 1–3.3)
LYMPHOCYTES # BLD AUTO: 14.3 % — SIGNIFICANT CHANGE UP (ref 13–44)
MCHC RBC-ENTMCNC: 31.4 PG — SIGNIFICANT CHANGE UP (ref 27–34)
MCHC RBC-ENTMCNC: 32.3 % — SIGNIFICANT CHANGE UP (ref 32–36)
MCV RBC AUTO: 97.3 FL — SIGNIFICANT CHANGE UP (ref 80–100)
MONOCYTES # BLD AUTO: 0.76 K/UL — SIGNIFICANT CHANGE UP (ref 0–0.9)
MONOCYTES NFR BLD AUTO: 6.1 % — SIGNIFICANT CHANGE UP (ref 2–14)
NEUTROPHILS # BLD AUTO: 9.86 K/UL — HIGH (ref 1.8–7.4)
NEUTROPHILS NFR BLD AUTO: 78.4 % — HIGH (ref 43–77)
NRBC # FLD: 0 K/UL — SIGNIFICANT CHANGE UP (ref 0–0)
PLATELET # BLD AUTO: 266 K/UL — SIGNIFICANT CHANGE UP (ref 150–400)
PMV BLD: 10.4 FL — SIGNIFICANT CHANGE UP (ref 7–13)
RBC # BLD: 3.31 M/UL — LOW (ref 3.8–5.2)
RBC # FLD: 12.6 % — SIGNIFICANT CHANGE UP (ref 10.3–14.5)
WBC # BLD: 12.56 K/UL — HIGH (ref 3.8–10.5)
WBC # FLD AUTO: 12.56 K/UL — HIGH (ref 3.8–10.5)

## 2019-05-09 RX ORDER — IBUPROFEN 200 MG
600 TABLET ORAL EVERY 6 HOURS
Refills: 0 | Status: DISCONTINUED | OUTPATIENT
Start: 2019-05-09 | End: 2019-05-11

## 2019-05-09 RX ORDER — NORETHINDRONE 0.35 MG/1
1 TABLET ORAL
Qty: 30 | Refills: 3
Start: 2019-05-09

## 2019-05-09 RX ADMIN — Medication 30 MILLIGRAM(S): at 09:04

## 2019-05-09 RX ADMIN — Medication 30 MILLIGRAM(S): at 10:00

## 2019-05-09 RX ADMIN — HEPARIN SODIUM 10000 UNIT(S): 5000 INJECTION INTRAVENOUS; SUBCUTANEOUS at 18:37

## 2019-05-09 RX ADMIN — Medication 600 MILLIGRAM(S): at 19:15

## 2019-05-09 RX ADMIN — OXYCODONE HYDROCHLORIDE 5 MILLIGRAM(S): 5 TABLET ORAL at 05:45

## 2019-05-09 RX ADMIN — Medication 975 MILLIGRAM(S): at 14:51

## 2019-05-09 RX ADMIN — Medication 975 MILLIGRAM(S): at 05:45

## 2019-05-09 RX ADMIN — OXYCODONE HYDROCHLORIDE 5 MILLIGRAM(S): 5 TABLET ORAL at 06:15

## 2019-05-09 RX ADMIN — Medication 100 MILLIGRAM(S): at 09:05

## 2019-05-09 RX ADMIN — OXYCODONE HYDROCHLORIDE 5 MILLIGRAM(S): 5 TABLET ORAL at 01:30

## 2019-05-09 RX ADMIN — SIMETHICONE 80 MILLIGRAM(S): 80 TABLET, CHEWABLE ORAL at 09:04

## 2019-05-09 RX ADMIN — OXYCODONE HYDROCHLORIDE 5 MILLIGRAM(S): 5 TABLET ORAL at 00:35

## 2019-05-09 RX ADMIN — Medication 600 MILLIGRAM(S): at 18:37

## 2019-05-09 RX ADMIN — HEPARIN SODIUM 10000 UNIT(S): 5000 INJECTION INTRAVENOUS; SUBCUTANEOUS at 05:45

## 2019-05-09 RX ADMIN — Medication 975 MILLIGRAM(S): at 15:30

## 2019-05-09 RX ADMIN — Medication 100 MILLIGRAM(S): at 00:25

## 2019-05-09 RX ADMIN — Medication 975 MILLIGRAM(S): at 06:15

## 2019-05-09 NOTE — PROGRESS NOTE ADULT - ASSESSMENT
A/P: 29yo POD#1 s/p pLTCS for NRFHT. Patient is stable and doing well post-operatively.    - Continue regular diet.  - Increase ambulation.  - Continue motrin, tylenol, oxycodone PRN for pain control.   - H/H stable on AM CBC  -Patient desires Micronor for Birth Control    Ghazala Koroma PGY-1

## 2019-05-10 RX ADMIN — Medication 975 MILLIGRAM(S): at 13:27

## 2019-05-10 RX ADMIN — Medication 600 MILLIGRAM(S): at 08:11

## 2019-05-10 RX ADMIN — Medication 975 MILLIGRAM(S): at 18:56

## 2019-05-10 RX ADMIN — SIMETHICONE 80 MILLIGRAM(S): 80 TABLET, CHEWABLE ORAL at 08:12

## 2019-05-10 RX ADMIN — Medication 975 MILLIGRAM(S): at 07:20

## 2019-05-10 RX ADMIN — Medication 600 MILLIGRAM(S): at 08:16

## 2019-05-10 RX ADMIN — Medication 975 MILLIGRAM(S): at 13:22

## 2019-05-10 RX ADMIN — Medication 600 MILLIGRAM(S): at 02:00

## 2019-05-10 RX ADMIN — Medication 600 MILLIGRAM(S): at 03:00

## 2019-05-10 RX ADMIN — Medication 600 MILLIGRAM(S): at 14:59

## 2019-05-10 RX ADMIN — Medication 975 MILLIGRAM(S): at 18:57

## 2019-05-10 RX ADMIN — Medication 100 MILLIGRAM(S): at 08:12

## 2019-05-10 RX ADMIN — Medication 975 MILLIGRAM(S): at 06:21

## 2019-05-10 RX ADMIN — Medication 600 MILLIGRAM(S): at 15:00

## 2019-05-10 RX ADMIN — HEPARIN SODIUM 10000 UNIT(S): 5000 INJECTION INTRAVENOUS; SUBCUTANEOUS at 06:00

## 2019-05-10 RX ADMIN — HEPARIN SODIUM 10000 UNIT(S): 5000 INJECTION INTRAVENOUS; SUBCUTANEOUS at 18:15

## 2019-05-10 NOTE — PROGRESS NOTE ADULT - PROBLEM SELECTOR PLAN 1
- Continue regular diet.  - Increase ambulation.  - Continue motrin, tylenol, oxycodone PRN for pain control.   -HSQ for DVT ppx  Ghazala Koroma PGY-1

## 2019-05-11 VITALS
OXYGEN SATURATION: 100 % | DIASTOLIC BLOOD PRESSURE: 82 MMHG | RESPIRATION RATE: 19 BRPM | SYSTOLIC BLOOD PRESSURE: 127 MMHG | HEART RATE: 85 BPM | TEMPERATURE: 99 F

## 2019-05-11 LAB — SURGICAL PATHOLOGY STUDY: SIGNIFICANT CHANGE UP

## 2019-05-11 RX ORDER — IBUPROFEN 200 MG
1 TABLET ORAL
Qty: 0 | Refills: 0 | DISCHARGE
Start: 2019-05-11

## 2019-05-11 RX ORDER — INSULIN GLARGINE 100 [IU]/ML
0 INJECTION, SOLUTION SUBCUTANEOUS
Qty: 0 | Refills: 0 | DISCHARGE

## 2019-05-11 RX ORDER — INSULIN LISPRO 100/ML
0 VIAL (ML) SUBCUTANEOUS
Qty: 0 | Refills: 0 | DISCHARGE

## 2019-05-11 RX ORDER — OXYCODONE HYDROCHLORIDE 5 MG/1
1 TABLET ORAL
Qty: 12 | Refills: 0
Start: 2019-05-11 | End: 2019-05-13

## 2019-05-11 RX ORDER — ACETAMINOPHEN 500 MG
3 TABLET ORAL
Qty: 0 | Refills: 0 | DISCHARGE
Start: 2019-05-11

## 2019-05-11 RX ADMIN — Medication 600 MILLIGRAM(S): at 01:00

## 2019-05-11 RX ADMIN — HEPARIN SODIUM 10000 UNIT(S): 5000 INJECTION INTRAVENOUS; SUBCUTANEOUS at 06:08

## 2019-05-11 RX ADMIN — Medication 600 MILLIGRAM(S): at 06:15

## 2019-05-11 RX ADMIN — Medication 975 MILLIGRAM(S): at 10:21

## 2019-05-11 RX ADMIN — Medication 975 MILLIGRAM(S): at 09:40

## 2019-05-11 RX ADMIN — Medication 600 MILLIGRAM(S): at 00:08

## 2019-05-11 NOTE — PROGRESS NOTE ADULT - SUBJECTIVE AND OBJECTIVE BOX
Postop Day  __1_ s/p   C- Section    THERAPY:  [  ] Spinal morphine   [ x ] Epidural morphine   [  ] IV PCA Hydromorphone 1 mg/ml    T(C): 36.8 (05-09-19 @ 14:31), Max: 36.8 (05-09-19 @ 14:31)  HR: 104 (05-09-19 @ 14:31) (104 - 104)  BP: 106/64 (05-09-19 @ 14:31) (106/64 - 106/64)  RR: --  SpO2: 100% (05-09-19 @ 14:31) (100% - 100%)    MEDICATIONS  (STANDING):  acetaminophen   Tablet .. 975 milliGRAM(s) Oral every 6 hours  diphtheria/tetanus/pertussis (acellular) Vaccine (ADAcel) 0.5 milliLiter(s) IntraMuscular once  heparin  Injectable 40385 Unit(s) SubCutaneous every 12 hours  ibuprofen  Tablet. 600 milliGRAM(s) Oral every 6 hours    MEDICATIONS  (PRN):  diphenhydrAMINE 25 milliGRAM(s) Oral every 6 hours PRN Itching  docusate sodium 100 milliGRAM(s) Oral two times a day PRN Stool softening  glycerin Suppository - Adult 1 Suppository(s) Rectal at bedtime PRN Constipation  lanolin Ointment 1 Application(s) Topical every 6 hours PRN Sore Nipples  magnesium hydroxide Suspension 30 milliLiter(s) Oral two times a day PRN Constipation  oxyCODONE    IR 5 milliGRAM(s) Oral every 3 hours PRN Moderate Pain (4 - 6)  oxyCODONE    IR 5 milliGRAM(s) Oral once PRN Severe Pain (7 - 10)  simethicone 80 milliGRAM(s) Chew every 4 hours PRN Gas      Pain:   _______mild____ at rest;  _____moderate______with activity    Sedation Score:	  [ x ] Alert	    [  ] Drowsy        [  ] Arousable	[  ] Asleep	[  ] Unresponsive    Side Effects:	  [  ] None	     [  ] Nausea        [ x ] Pruritus        [  ] Weakness   [  ] Numbness        ASSESSMENT/ PLAN  [ x] Side effects resolving      [  x ] Patient made aware of PRN meds available     [ x] Discontinue & switch to PRN pain medications        [  ] Continue       Patient states lower extremities feel and move normally. No apparent anesthetic complications.
ANESTHESIA POSTOP CHECK    28y Female POSTOP DAY 1     Vital Signs Last 24 Hrs  T(C): 36.8 (09 May 2019 14:31), Max: 36.9 (08 May 2019 21:39)  T(F): 98.3 (09 May 2019 14:31), Max: 98.4 (08 May 2019 21:39)  HR: 104 (09 May 2019 14:31) (88 - 104)  BP: 106/64 (09 May 2019 14:31) (94/52 - 116/68)  BP(mean): --  RR: 18 (09 May 2019 06:02) (18 - 19)  SpO2: 100% (09 May 2019 14:31) (100% - 100%)  I&O's Summary    08 May 2019 07:01  -  09 May 2019 07:00  --------------------------------------------------------  IN: 0 mL / OUT: 865 mL / NET: -865 mL        NO APPARENT ANESTHESIA COMPLICATIONS
OB Postpartum Note: Primary  Delivery, POD#3    S: 29yo POD#3 s/p pLTCS. The patient feels well.  Pain is well controlled. She is tolerating a regular diet and passing flatus. She is voiding spontaneously, and ambulating without difficulty. Denies CP/SOB. Denies lightheadedness/dizziness. Denies N/V.    O:  Vitals:  Vital Signs Last 24 Hrs  T(C): 37 (11 May 2019 06:47), Max: 37.1 (11 May 2019 00:00)  T(F): 98.6 (11 May 2019 06:47), Max: 98.8 (11 May 2019 00:00)  HR: 85 (11 May 2019 06:47) (80 - 103)  BP: 127/82 (11 May 2019 06:47) (122/84 - 127/82)  BP(mean): --  RR: 19 (11 May 2019 06:47) (17 - 20)  SpO2: 100% (11 May 2019 06:47) (99% - 100%)    MEDICATIONS  (STANDING):  acetaminophen   Tablet .. 975 milliGRAM(s) Oral every 6 hours  diphtheria/tetanus/pertussis (acellular) Vaccine (ADAcel) 0.5 milliLiter(s) IntraMuscular once  heparin  Injectable 37825 Unit(s) SubCutaneous every 12 hours  ibuprofen  Tablet. 600 milliGRAM(s) Oral every 6 hours    MEDICATIONS  (PRN):  diphenhydrAMINE 25 milliGRAM(s) Oral every 6 hours PRN Itching  docusate sodium 100 milliGRAM(s) Oral two times a day PRN Stool softening  glycerin Suppository - Adult 1 Suppository(s) Rectal at bedtime PRN Constipation  lanolin Ointment 1 Application(s) Topical every 6 hours PRN Sore Nipples  magnesium hydroxide Suspension 30 milliLiter(s) Oral two times a day PRN Constipation  oxyCODONE    IR 5 milliGRAM(s) Oral every 3 hours PRN Moderate Pain (4 - 6)  oxyCODONE    IR 5 milliGRAM(s) Oral once PRN Severe Pain (7 - 10)  simethicone 80 milliGRAM(s) Chew every 4 hours PRN Gas      LABS:  Blood type: O Positive  Rubella IgG: RPR: Negative                          10.4<L>   12.56<H> >-----------< 266    (  @ 05:35 )             32.2<L>              Physical exam:  Gen: NAD  Abdomen: Soft, nontender, no distension , firm uterine fundus at umbilicus.  Incision: Clean, dry, and intact   Pelvic: Normal lochia noted  Ext: No calf tenderness
OB Progress Note: Primary  Delivery, POD#1    S: 27yo POD#1 s/p pLTCS. Her pain is well controlled. She is tolerating a regular diet and passing flatus. Denies N/V. Denies CP/SOB/lightheadedness/dizziness. She is ambulating and voiding without difficulty.     O:   Vital Signs Last 24 Hrs  T(C): 36.6 (09 May 2019 06:02), Max: 37 (08 May 2019 08:30)  T(F): 97.9 (09 May 2019 06:02), Max: 98.6 (08 May 2019 08:30)  HR: 100 (09 May 2019 06:02) (88 - 100)  BP: 116/68 (09 May 2019 06:02) (94/52 - 129/90)  BP(mean): 73 (08 May 2019 08:30) (71 - 78)  RR: 18 (09 May 2019 06:02) (13 - 19)  SpO2: 100% (09 May 2019 06:02) (94% - 100%)    Labs:  Blood type: O Positive  Rubella IgG: RPR: Negative                          10.4<L>   12.56<H> >-----------< 266    (  @ 05:35 )             32.2<L>                        11.1<L>   9.52 >-----------< 259    (  @ 20:07 )             33.8<L>            PE:  General: NAD  Abdomen: Mildly distended, appropriately tender, incision c/d/i.  Extremities: No erythema, no pitting edema
OB Progress Note: pTLCS, POD#2    S: 29yo POD#2 s/p pLTCS. Pain is well controlled. She is tolerating a regular diet and passing flatus. She is voiding spontaneously, and ambulating without difficulty. Denies CP/SOB. Denies lightheadedness/dizziness. Denies N/V.    O:  Vitals:  Vital Signs Last 24 Hrs  T(C): 36.7 (10 May 2019 06:35), Max: 36.8 (09 May 2019 14:31)  T(F): 98 (10 May 2019 06:35), Max: 98.3 (09 May 2019 14:31)  HR: 108 (10 May 2019 01:41) (104 - 108)  BP: 108/73 (10 May 2019 06:35) (106/64 - 126/77)  BP(mean): --  RR: 16 (10 May 2019 06:35) (16 - 18)  SpO2: 100% (10 May 2019 06:35) (100% - 100%)    MEDICATIONS  (STANDING):  acetaminophen   Tablet .. 975 milliGRAM(s) Oral every 6 hours  diphtheria/tetanus/pertussis (acellular) Vaccine (ADAcel) 0.5 milliLiter(s) IntraMuscular once  heparin  Injectable 01428 Unit(s) SubCutaneous every 12 hours  ibuprofen  Tablet. 600 milliGRAM(s) Oral every 6 hours      MEDICATIONS  (PRN):  diphenhydrAMINE 25 milliGRAM(s) Oral every 6 hours PRN Itching  docusate sodium 100 milliGRAM(s) Oral two times a day PRN Stool softening  glycerin Suppository - Adult 1 Suppository(s) Rectal at bedtime PRN Constipation  lanolin Ointment 1 Application(s) Topical every 6 hours PRN Sore Nipples  magnesium hydroxide Suspension 30 milliLiter(s) Oral two times a day PRN Constipation  oxyCODONE    IR 5 milliGRAM(s) Oral every 3 hours PRN Moderate Pain (4 - 6)  oxyCODONE    IR 5 milliGRAM(s) Oral once PRN Severe Pain (7 - 10)  simethicone 80 milliGRAM(s) Chew every 4 hours PRN Gas      Labs:  Blood type: O Positive  Rubella IgG: RPR: Negative                          10.4<L>   12.56<H> >-----------< 266    ( 05-09 @ 05:35 )             32.2<L>                  PE:  General: NAD  Abdomen: Soft, appropriately tender, incision c/d/i.  Extremities: No erythema, no pitting edema

## 2019-05-11 NOTE — PROGRESS NOTE ADULT - ATTENDING COMMENTS
pt in shower when rounding. Discussed pt's post operative course with resident.
Patient seen and examined by me.  Agree with above assessment and plan
Patient was in the NICU when I went to make rounds. Per residents and nursing staff, patient stable POD#2.

## 2019-05-11 NOTE — PROGRESS NOTE ADULT - PROBLEM SELECTOR PLAN 1
- Continue motrin, tylenol, oxycodone PRN for pain control.  - Increase ambulation  - Continue regular diet  - Discharge planning  Ghazala Koroma PGY-1

## 2019-05-24 ENCOUNTER — APPOINTMENT (OUTPATIENT)
Dept: OBGYN | Facility: HOSPITAL | Age: 29
End: 2019-05-24

## 2019-05-24 ENCOUNTER — OUTPATIENT (OUTPATIENT)
Dept: OUTPATIENT SERVICES | Facility: HOSPITAL | Age: 29
LOS: 1 days | End: 2019-05-24

## 2019-05-24 VITALS
SYSTOLIC BLOOD PRESSURE: 126 MMHG | WEIGHT: 219.69 LBS | BODY MASS INDEX: 40.43 KG/M2 | HEART RATE: 88 BPM | HEIGHT: 62 IN | DIASTOLIC BLOOD PRESSURE: 88 MMHG

## 2019-05-24 DIAGNOSIS — R22.9 LOCALIZED SWELLING, MASS AND LUMP, UNSPECIFIED: Chronic | ICD-10-CM

## 2019-05-24 DIAGNOSIS — R58 HEMORRHAGE, NOT ELSEWHERE CLASSIFIED: Chronic | ICD-10-CM

## 2019-05-24 NOTE — HISTORY OF PRESENT ILLNESS
[Last Pap Date: ___] : Last Pap Date: [unfilled] [Postpartum Follow Up] : postpartum follow up [Complications:___] : complications include: [unfilled] [Primary C/S] : delivered by  section [Delivery Date: ___] : on [unfilled] [Rhogam] : Rhogam was not administered [Male] : Delivery History: baby boy [Wt. ___] : weighing [unfilled] [Rubella Vaccine] : Rubella vaccine was not administered [BTL] : no tubal ligation [Pertussis Vaccine] : Pertussis vaccine was not administered [Breastfeeding] : currently nursing [Discharge HCT: ___] : hematocrit level was [unfilled] [S/Sx PP Depression] : no signs/symptoms of postpartum depression [Discharge HGB: ___] : hemoglobin level was [unfilled] [Clean/Dry/Intact] : clean, dry and intact [Healed] : healed [None] : No associated symptoms are reported [Doing Well] : is doing well [de-identified] : Keep incision clean and dry. No heavy lifting. Bottlefeeding/happy with baby. Will do 2 hour GCT next visit- given fasting instructions. Nonimmune varicella- will consider vaccine for next visit. Contraceptive counseling- considering nexplanon. Continue with prenatal vit daily. RTC 4 weeks. MHegarty NP [FreeTextEntry8] : Here for wound check.  19

## 2019-05-29 DIAGNOSIS — Z98.891 HISTORY OF UTERINE SCAR FROM PREVIOUS SURGERY: ICD-10-CM

## 2019-06-13 NOTE — OB RN PATIENT PROFILE - EDUCATION OF PARENTS ON THE BENEFITS OF SKIN TO SKIN AND BREASTFEEDING TO BOTH MOTHER AND INFANT.
Advancement Flap (Single) Text: The defect edges were debeveled with a #15 scalpel blade.  Given the location of the defect and the proximity to free margins a single advancement flap was deemed most appropriate.  Using a sterile surgical marker, an appropriate advancement flap was drawn incorporating the defect and placing the expected incisions within the relaxed skin tension lines where possible.    The area thus outlined was incised deep to adipose tissue with a #15 scalpel blade.  The skin margins were undermined to an appropriate distance in all directions utilizing iris scissors. Statement Selected

## 2019-06-21 ENCOUNTER — OUTPATIENT (OUTPATIENT)
Dept: OUTPATIENT SERVICES | Facility: HOSPITAL | Age: 29
LOS: 1 days | End: 2019-06-21

## 2019-06-21 ENCOUNTER — APPOINTMENT (OUTPATIENT)
Dept: OBGYN | Facility: HOSPITAL | Age: 29
End: 2019-06-21

## 2019-06-21 VITALS
WEIGHT: 222 LBS | DIASTOLIC BLOOD PRESSURE: 88 MMHG | BODY MASS INDEX: 40.85 KG/M2 | HEART RATE: 84 BPM | SYSTOLIC BLOOD PRESSURE: 128 MMHG | HEIGHT: 62 IN

## 2019-06-21 DIAGNOSIS — Z34.90 ENCOUNTER FOR SUPERVISION OF NORMAL PREGNANCY, UNSPECIFIED, UNSPECIFIED TRIMESTER: ICD-10-CM

## 2019-06-21 DIAGNOSIS — R58 HEMORRHAGE, NOT ELSEWHERE CLASSIFIED: Chronic | ICD-10-CM

## 2019-06-21 DIAGNOSIS — Z86.32 PERSONAL HISTORY OF GESTATIONAL DIABETES: ICD-10-CM

## 2019-06-21 DIAGNOSIS — Z34.92 ENCOUNTER FOR SUPERVISION OF NORMAL PREGNANCY, UNSPECIFIED, SECOND TRIMESTER: ICD-10-CM

## 2019-06-21 DIAGNOSIS — O09.93 SUPERVISION OF HIGH RISK PREGNANCY, UNSPECIFIED, THIRD TRIMESTER: ICD-10-CM

## 2019-06-21 DIAGNOSIS — Z32.01 ENCOUNTER FOR PREGNANCY TEST, RESULT POSITIVE: ICD-10-CM

## 2019-06-21 DIAGNOSIS — R22.9 LOCALIZED SWELLING, MASS AND LUMP, UNSPECIFIED: Chronic | ICD-10-CM

## 2019-06-21 NOTE — HISTORY OF PRESENT ILLNESS
[Postpartum Follow Up] : postpartum follow up [Complications:___] : complications include: [unfilled] [Last Pap Date: ___] : Last Pap Date: [unfilled] [Delivery Date: ___] : on [unfilled] [Primary C/S] : delivered by  section [Male] : Delivery History: baby boy [Wt. ___] : weighing [unfilled] [NICU: ___] : NICU: [unfilled] [Rhogam] : Rhogam was not administered [Rubella Vaccine] : Rubella vaccine was not administered [Pertussis Vaccine] : Pertussis vaccine was not administered [BTL] : no tubal ligation [Breastfeeding] : currently nursing [Discharge HCT: ___] : hematocrit level was [unfilled] [Resumed Menses] : has not resumed her menses [Discharge HGB: ___] : hemoglobin level was [unfilled] [Resumed [place]] : has not resumed intercourse [Intended Contraception] : Intended Contraception: [Healed] : healed [Back to Normal] : is back to normal in size [None] : no vaginal bleeding [Normal] : the vagina was normal [No [place]] : to avoid sexual intercourse [Doing Well] : is doing well [Limited ADLs] : to participate in activities of daily living with limitations [No Work] : not to work [Limited Housework] : to do housework with limitations [No Sports] : not to participate in sports [de-identified] : Nexplanon [de-identified] : RTC next week for 2 hour GTT and family planning/Nexplanon placement [de-identified] : Patient was scheduled for 2 hour GTT but can not stay due to  issue. Feeling well today.  States she continues to check blood sugars at home and are WNL.  Would like Nexplanon for contraception. Enrolled in WIC program [de-identified] : Until 7/3/19

## 2019-07-02 ENCOUNTER — RESULT CHARGE (OUTPATIENT)
Age: 29
End: 2019-07-02

## 2019-07-02 ENCOUNTER — APPOINTMENT (OUTPATIENT)
Dept: OBGYN | Facility: HOSPITAL | Age: 29
End: 2019-07-02

## 2019-07-02 ENCOUNTER — OUTPATIENT (OUTPATIENT)
Dept: OUTPATIENT SERVICES | Facility: HOSPITAL | Age: 29
LOS: 1 days | End: 2019-07-02

## 2019-07-02 ENCOUNTER — LABORATORY RESULT (OUTPATIENT)
Age: 29
End: 2019-07-02

## 2019-07-02 VITALS
SYSTOLIC BLOOD PRESSURE: 111 MMHG | WEIGHT: 216.05 LBS | HEART RATE: 81 BPM | DIASTOLIC BLOOD PRESSURE: 76 MMHG | BODY MASS INDEX: 39.52 KG/M2

## 2019-07-02 VITALS
SYSTOLIC BLOOD PRESSURE: 132 MMHG | HEART RATE: 84 BPM | DIASTOLIC BLOOD PRESSURE: 69 MMHG | WEIGHT: 218 LBS | HEIGHT: 63 IN | BODY MASS INDEX: 38.62 KG/M2

## 2019-07-02 DIAGNOSIS — R22.9 LOCALIZED SWELLING, MASS AND LUMP, UNSPECIFIED: Chronic | ICD-10-CM

## 2019-07-02 DIAGNOSIS — Z30.012 ENCOUNTER FOR PRESCRIPTION OF EMERGENCY CONTRACEPTION: ICD-10-CM

## 2019-07-02 DIAGNOSIS — Z30.017 ENCOUNTER FOR INITIAL PRESCRIPTION OF IMPLANTABLE SUBDERMAL CONTRACEPTIVE: ICD-10-CM

## 2019-07-02 DIAGNOSIS — R58 HEMORRHAGE, NOT ELSEWHERE CLASSIFIED: Chronic | ICD-10-CM

## 2019-07-02 LAB
GLUCOSE 2H P GLC SERPL-MCNC: 85 MG/DL — SIGNIFICANT CHANGE UP (ref 70–120)
GLUCOSE P FAST SERPL-MCNC: 92 MG/DL — SIGNIFICANT CHANGE UP (ref 70–108)

## 2019-07-02 RX ORDER — LEVONORGESTREL 1.5 MG/1
1.5 TABLET ORAL
Qty: 0 | Refills: 0 | Status: COMPLETED | OUTPATIENT
Start: 2019-07-02

## 2019-07-31 NOTE — DISCHARGE NOTE OB - DO NOT DIET OR “STARVE” YOURSELF INTO GETTING BACK TO PRE-PREGNANCY SHAPE
Date of Service: 7/31/2019    PREOPERATIVE DIAGNOSIS:  Esotropia.    POSTOPERATIVE DIAGNOSIS:  Esotropia.    OPERATION PERFORMED:  Recession both medial rectus muscles 10.5 mm from the limbus.    SURGEON:  Bhanu Lundberg MD    ASSISTANT:  GLORIA Manriquez    ANESTHESIOLOGIST:  JEWELL: Vanessa Pretty CRNA  Anesthesiologist: Doc Foss MD    ANESTHESIOLOGY TYPE:  General      Estimated Blood Lost: Trace    Complications: None    Specimens Removed: None    DESCRIPTION OF PROCEDURE:  After the induction of general anesthesia, the patient was lying supinely on the operating room table.  Forced ductions were performed on the left eye and were found to be free to all directions.  The left globe was fixated in the inferonasal quadrant.  A conjunctival incision was made concentric with the limbus at 6 mm from the limbus.  A radial incision was made through the Tenon's capsule.  A Raphael's tenotomy hook was used to engage the inferior pole of the medial rectus muscle.  Two successive Green muscle hooks were then placed beneath the muscle.  The conjunctiva and Tenon's capsule were prolapsed over the toe of the muscle hook.  The intermuscular fascia was incised at the toe of the hook.  The pole test was performed and the entire muscle was found to be on the hook.  The anterior fascial extension of the muscle capsule was incised.  The check ligaments and intermuscular fascia were then incised posteriorly.  A double armed 6-0 Vicryl suture was placed through the muscle within 1 mm of its insertion to the globe.  A lock bite was taken at either border.  The muscle was then disinserted from the globe.  The insertion measured 4.5 mm from the limbus.  A distance of 6 mm was then measured from the inferior insertion site, the inferior arm of the suture was placed in the sclera at that position.  A distance of 6 mm was then measured posterior to the superior scleral insertion site and the superior suture was placed in the sclera at  that position. The muscle was then secured to the globe by placing a tic bite at the desired recession location and then passing the suture through the original insertion.  Both arms of suture were then tied securely at the original insertion site after pulling the muscle up snugly against the globe. The conjunctiva and Tenon's capsule were swept back into the fornix and closed with interrupted 8-0 Vicryl sutures.     Forced ductions were performed on the right eye and were found to be free to all directions.  The right  globe was fixated in the inferonasal quadrant.  A conjunctival incision was made concentric with the limbus at 6 mm from the limbus.  A radial incision was made through the Tenon's capsule.  A Raphael's tenotomy hook was used to engage the inferior pole of the medial rectus muscle.  Two successive Green muscle hooks were then placed beneath the muscle.  The conjunctiva and Tenon's capsule were prolapsed over the toe of the muscle hook.  The intermuscular fascia was incised at the toe of the hook.  The pole test was performed and the entire muscle was found to be on the hook.  The anterior fascial extension of the muscle capsule was incised.  The check ligaments and intermuscular fascia were then incised posteriorly.  A double armed 6-0 Vicryl suture was placed through the muscle within 1 mm of its insertion to the globe.  A lock bite was taken at either border.  The muscle was then disinserted from the globe.  The insertion measured 4.5 mm from the limbus.  A distance of 6 mm was then measured from the inferior insertion site.  The inferior arm of the suture was placed in the sclera at that position. A distance of 6 mm was then measured posterior to the superior scleral insertion site and the superior suture was placed in the sclera at that position. The muscle was then secured to the globe by placing a tic bite at the desired recession location and then passing the suture through the original  insertion.  Both arms of suture were then tied securely at the original insertion site after pulling the muscle up snugly against the globe. The conjunctiva and Tenon's capsule were swept back into the fornix and closed with interrupted 8-0 Vicryl sutures.     TobraDex drops and ointment were applied to both eyes.  The patient awakened from anesthesia without incident and was taken to the recovery room in good condition.      Dictated By: Bhanu Lundberg MD  Signing Provider: Bhanu Lundberg MD    [facility]/     Statement Selected

## 2020-04-14 ENCOUNTER — APPOINTMENT (OUTPATIENT)
Dept: OBGYN | Facility: HOSPITAL | Age: 30
End: 2020-04-14

## 2020-04-14 ENCOUNTER — OUTPATIENT (OUTPATIENT)
Dept: OUTPATIENT SERVICES | Facility: HOSPITAL | Age: 30
LOS: 1 days | End: 2020-04-14

## 2020-04-14 VITALS
TEMPERATURE: 98.6 F | HEART RATE: 103 BPM | BODY MASS INDEX: 41.46 KG/M2 | HEIGHT: 63 IN | WEIGHT: 234 LBS | SYSTOLIC BLOOD PRESSURE: 133 MMHG | DIASTOLIC BLOOD PRESSURE: 85 MMHG

## 2020-04-14 DIAGNOSIS — R22.9 LOCALIZED SWELLING, MASS AND LUMP, UNSPECIFIED: Chronic | ICD-10-CM

## 2020-04-14 DIAGNOSIS — R58 HEMORRHAGE, NOT ELSEWHERE CLASSIFIED: Chronic | ICD-10-CM

## 2020-04-15 DIAGNOSIS — Z30.46 ENCOUNTER FOR SURVEILLANCE OF IMPLANTABLE SUBDERMAL CONTRACEPTIVE: ICD-10-CM

## 2020-04-15 DIAGNOSIS — Z30.011 ENCOUNTER FOR INITIAL PRESCRIPTION OF CONTRACEPTIVE PILLS: ICD-10-CM

## 2020-04-15 DIAGNOSIS — M54.9 DORSALGIA, UNSPECIFIED: ICD-10-CM

## 2020-10-19 ENCOUNTER — EMERGENCY (EMERGENCY)
Facility: HOSPITAL | Age: 30
LOS: 1 days | Discharge: HOME | End: 2020-10-19
Attending: STUDENT IN AN ORGANIZED HEALTH CARE EDUCATION/TRAINING PROGRAM | Admitting: STUDENT IN AN ORGANIZED HEALTH CARE EDUCATION/TRAINING PROGRAM
Payer: MEDICAID

## 2020-10-19 VITALS
SYSTOLIC BLOOD PRESSURE: 110 MMHG | HEART RATE: 84 BPM | TEMPERATURE: 98 F | RESPIRATION RATE: 16 BRPM | DIASTOLIC BLOOD PRESSURE: 67 MMHG | HEIGHT: 63 IN | OXYGEN SATURATION: 100 %

## 2020-10-19 DIAGNOSIS — R22.9 LOCALIZED SWELLING, MASS AND LUMP, UNSPECIFIED: Chronic | ICD-10-CM

## 2020-10-19 DIAGNOSIS — R58 HEMORRHAGE, NOT ELSEWHERE CLASSIFIED: Chronic | ICD-10-CM

## 2020-10-19 PROCEDURE — 99283 EMERGENCY DEPT VISIT LOW MDM: CPT

## 2020-10-19 RX ORDER — ACETAMINOPHEN 500 MG
650 TABLET ORAL ONCE
Refills: 0 | Status: COMPLETED | OUTPATIENT
Start: 2020-10-19 | End: 2020-10-19

## 2020-10-19 RX ADMIN — Medication 650 MILLIGRAM(S): at 17:24

## 2020-10-19 NOTE — ED PROVIDER NOTE - SKIN, MLM
Skin normal color for race, warm, dry and intact. No evidence of rash. No skin rippling or dimpling over breast. No erythema, swelling or nipple discharge.

## 2020-10-19 NOTE — ED PROVIDER NOTE - ATTENDING CONTRIBUTION TO CARE
29 y/o female with hx of eczema presents c/o 2x weeks of intermittent L breast and nipple pain that she describes as a "biting, sharp" sensation and itchiness, worse around the nipple. that severe pain has not returned but has been feeling alump in her right inner upper breast that is tender to touch, no overlying redness, warmth, swelling, no fevers, noc hills. " Pt has used a topical hydrocortisone at home with mild temporary relief. Denies breast feeding. Denies sensation being similar to her eczema. Denies nipple discahrge, f/c, HA, malaise, CP, SOB, rash, skin changes or other associated sx. No other medical complaints at this time.   LMP - 10/18 and regular   on exam breasts equal in size, no cellulitis, warmth or erythema of skin, no palpable lump or abscess  recs for motrin/tylenol, warm compresses and pmd follow up for comprehensive breast US

## 2020-10-19 NOTE — ED PROVIDER NOTE - NSFOLLOWUPINSTRUCTIONS_ED_ALL_ED_FT
You came in for breast pain and itchiness. We did not feel any lumps or see any skin changes on our physical exam. Typically in the ER we image breasts if we are concerned for an abscess.  You will need to follow up with your PCP as you may need a mammogram    Advance activity as tolerated.  Continue all previously prescribed medications as directed unless otherwise instructed.  Follow up with your primary care physician in 48-72 hours- bring copies of your results.  Return to the ER for worsening or persistent symptoms, and/or ANY NEW OR CONCERNING SYMPTOMS. If you have issues obtaining follow up, please call: [phone] to obtain a doctor or specialist who takes your insurance in your area.  You may call [phone] to make an appointment with the internal medicine clinic.

## 2020-10-19 NOTE — ED ADULT TRIAGE NOTE - CHIEF COMPLAINT QUOTE
Pt states that she has been having discomfort to left nipple and now has a lump in her breast for the past 2-3 weeks.  Pt denies past medical history

## 2020-10-19 NOTE — ED PROVIDER NOTE - PATIENT PORTAL LINK FT
You can access the FollowMyHealth Patient Portal offered by [facility] by registering at the following website: [email or web address]. By joining [employer]: turn off the lights’s FollowMyHealth portal, you will also be able to view your health information using other applications (apps) compatible with our system.

## 2020-10-19 NOTE — ED PROVIDER NOTE - OBJECTIVE STATEMENT
29 y/o female with hx of eczema presents c/o 2x weeks of intermittent L breast and nipple pain that she describes as a "biting, sharp" sensation and itchiness, worse around the nipple. 4x days ago the pain and itchiness returned but she also felt swelling of "a vein or lump." Pt has used a topical hydrocortisone at home with mild temporary relief. Denies breast feeding. Denies sensation being similar to her eczema. Denies nipple discahrge, f/c, HA, malaise, CP, SOB, rash, skin changes or other associated sx. No other medical complaints at this time.   LMP - 10/18 and regular   Mother had hx of needle biopsy of breast lump - benign   No fam hx of CA.

## 2020-10-19 NOTE — ED PROVIDER NOTE - PROGRESS NOTE DETAILS
MALINDA Patton: Pt left prior to being registered. Pt given follow up instructions and return precautions prior to leaving. Also called pt to confirm that she left.

## 2020-11-09 NOTE — OB PROVIDER DELIVERY SUMMARY - NSABNHEARTRATE_OBGYN_ALL_OB
Please let patient know that her labs are normal. Your liver tests are normal. Abnormal Fetal Heart Rate Category II

## 2020-12-16 ENCOUNTER — EMERGENCY (EMERGENCY)
Facility: HOSPITAL | Age: 30
LOS: 1 days | Discharge: HOME | End: 2020-12-16
Attending: EMERGENCY MEDICINE | Admitting: EMERGENCY MEDICINE
Payer: MEDICAID

## 2020-12-16 VITALS
HEART RATE: 88 BPM | TEMPERATURE: 99 F | OXYGEN SATURATION: 100 % | RESPIRATION RATE: 16 BRPM | SYSTOLIC BLOOD PRESSURE: 118 MMHG | DIASTOLIC BLOOD PRESSURE: 77 MMHG

## 2020-12-16 VITALS
HEART RATE: 88 BPM | HEIGHT: 63 IN | DIASTOLIC BLOOD PRESSURE: 62 MMHG | TEMPERATURE: 98 F | SYSTOLIC BLOOD PRESSURE: 127 MMHG | OXYGEN SATURATION: 100 % | RESPIRATION RATE: 16 BRPM

## 2020-12-16 DIAGNOSIS — R58 HEMORRHAGE, NOT ELSEWHERE CLASSIFIED: Chronic | ICD-10-CM

## 2020-12-16 DIAGNOSIS — R22.9 LOCALIZED SWELLING, MASS AND LUMP, UNSPECIFIED: Chronic | ICD-10-CM

## 2020-12-16 LAB
ALBUMIN SERPL ELPH-MCNC: 4.1 G/DL — SIGNIFICANT CHANGE UP (ref 3.3–5)
ALP SERPL-CCNC: 75 U/L — SIGNIFICANT CHANGE UP (ref 40–120)
ALT FLD-CCNC: 18 U/L — SIGNIFICANT CHANGE UP (ref 4–33)
ANION GAP SERPL CALC-SCNC: 11 MMOL/L — SIGNIFICANT CHANGE UP (ref 7–14)
APPEARANCE UR: CLEAR — SIGNIFICANT CHANGE UP
AST SERPL-CCNC: 13 U/L — SIGNIFICANT CHANGE UP (ref 4–32)
BASOPHILS # BLD AUTO: 0.02 K/UL — SIGNIFICANT CHANGE UP (ref 0–0.2)
BASOPHILS NFR BLD AUTO: 0.2 % — SIGNIFICANT CHANGE UP (ref 0–2)
BILIRUB SERPL-MCNC: 0.4 MG/DL — SIGNIFICANT CHANGE UP (ref 0.2–1.2)
BILIRUB UR-MCNC: NEGATIVE — SIGNIFICANT CHANGE UP
BLD GP AB SCN SERPL QL: NEGATIVE — SIGNIFICANT CHANGE UP
BUN SERPL-MCNC: 6 MG/DL — LOW (ref 7–23)
CALCIUM SERPL-MCNC: 9.2 MG/DL — SIGNIFICANT CHANGE UP (ref 8.4–10.5)
CHLORIDE SERPL-SCNC: 100 MMOL/L — SIGNIFICANT CHANGE UP (ref 98–107)
CO2 SERPL-SCNC: 22 MMOL/L — SIGNIFICANT CHANGE UP (ref 22–31)
COLOR SPEC: YELLOW — SIGNIFICANT CHANGE UP
CREAT SERPL-MCNC: 0.66 MG/DL — SIGNIFICANT CHANGE UP (ref 0.5–1.3)
DIFF PNL FLD: NEGATIVE — SIGNIFICANT CHANGE UP
EOSINOPHIL # BLD AUTO: 0.07 K/UL — SIGNIFICANT CHANGE UP (ref 0–0.5)
EOSINOPHIL NFR BLD AUTO: 0.6 % — SIGNIFICANT CHANGE UP (ref 0–6)
GLUCOSE SERPL-MCNC: 101 MG/DL — HIGH (ref 70–99)
GLUCOSE UR QL: NEGATIVE — SIGNIFICANT CHANGE UP
HCG SERPL-ACNC: SIGNIFICANT CHANGE UP MIU/ML
HCT VFR BLD CALC: 38.7 % — SIGNIFICANT CHANGE UP (ref 34.5–45)
HGB BLD-MCNC: 12.5 G/DL — SIGNIFICANT CHANGE UP (ref 11.5–15.5)
IANC: 7.97 K/UL — SIGNIFICANT CHANGE UP (ref 1.5–8.5)
IMM GRANULOCYTES NFR BLD AUTO: 0.6 % — SIGNIFICANT CHANGE UP (ref 0–1.5)
INR BLD: 1.08 RATIO — SIGNIFICANT CHANGE UP (ref 0.88–1.17)
KETONES UR-MCNC: NEGATIVE — SIGNIFICANT CHANGE UP
LEUKOCYTE ESTERASE UR-ACNC: NEGATIVE — SIGNIFICANT CHANGE UP
LYMPHOCYTES # BLD AUTO: 2.85 K/UL — SIGNIFICANT CHANGE UP (ref 1–3.3)
LYMPHOCYTES # BLD AUTO: 24.9 % — SIGNIFICANT CHANGE UP (ref 13–44)
MCHC RBC-ENTMCNC: 29.6 PG — SIGNIFICANT CHANGE UP (ref 27–34)
MCHC RBC-ENTMCNC: 32.3 GM/DL — SIGNIFICANT CHANGE UP (ref 32–36)
MCV RBC AUTO: 91.5 FL — SIGNIFICANT CHANGE UP (ref 80–100)
MONOCYTES # BLD AUTO: 0.46 K/UL — SIGNIFICANT CHANGE UP (ref 0–0.9)
MONOCYTES NFR BLD AUTO: 4 % — SIGNIFICANT CHANGE UP (ref 2–14)
NEUTROPHILS # BLD AUTO: 7.97 K/UL — HIGH (ref 1.8–7.4)
NEUTROPHILS NFR BLD AUTO: 69.7 % — SIGNIFICANT CHANGE UP (ref 43–77)
NITRITE UR-MCNC: NEGATIVE — SIGNIFICANT CHANGE UP
NRBC # BLD: 0 /100 WBCS — SIGNIFICANT CHANGE UP
NRBC # FLD: 0 K/UL — SIGNIFICANT CHANGE UP
PH UR: 6.5 — SIGNIFICANT CHANGE UP (ref 5–8)
PLATELET # BLD AUTO: 275 K/UL — SIGNIFICANT CHANGE UP (ref 150–400)
POTASSIUM SERPL-MCNC: 4.2 MMOL/L — SIGNIFICANT CHANGE UP (ref 3.5–5.3)
POTASSIUM SERPL-SCNC: 4.2 MMOL/L — SIGNIFICANT CHANGE UP (ref 3.5–5.3)
PROT SERPL-MCNC: 6.9 G/DL — SIGNIFICANT CHANGE UP (ref 6–8.3)
PROT UR-MCNC: ABNORMAL
PROTHROM AB SERPL-ACNC: 12.4 SEC — SIGNIFICANT CHANGE UP (ref 9.8–13.1)
RBC # BLD: 4.23 M/UL — SIGNIFICANT CHANGE UP (ref 3.8–5.2)
RBC # FLD: 12.8 % — SIGNIFICANT CHANGE UP (ref 10.3–14.5)
RH IG SCN BLD-IMP: POSITIVE — SIGNIFICANT CHANGE UP
SODIUM SERPL-SCNC: 133 MMOL/L — LOW (ref 135–145)
SP GR SPEC: 1.03 — HIGH (ref 1.01–1.02)
UROBILINOGEN FLD QL: SIGNIFICANT CHANGE UP
WBC # BLD: 11.44 K/UL — HIGH (ref 3.8–10.5)
WBC # FLD AUTO: 11.44 K/UL — HIGH (ref 3.8–10.5)

## 2020-12-16 PROCEDURE — 99284 EMERGENCY DEPT VISIT MOD MDM: CPT

## 2020-12-16 PROCEDURE — 76801 OB US < 14 WKS SINGLE FETUS: CPT | Mod: 26

## 2020-12-16 RX ORDER — SODIUM CHLORIDE 9 MG/ML
1000 INJECTION INTRAMUSCULAR; INTRAVENOUS; SUBCUTANEOUS ONCE
Refills: 0 | Status: COMPLETED | OUTPATIENT
Start: 2020-12-16 | End: 2020-12-16

## 2020-12-16 RX ADMIN — SODIUM CHLORIDE 2000 MILLILITER(S): 9 INJECTION INTRAMUSCULAR; INTRAVENOUS; SUBCUTANEOUS at 09:49

## 2020-12-16 NOTE — ED PROVIDER NOTE - NSFOLLOWUPINSTRUCTIONS_ED_ALL_ED_FT
Advance activity as tolerated.  Continue all previously prescribed medications as directed unless otherwise instructed.  Follow up with your primary care physician in 48-72 hours- bring copies of your results.  Return to the ER for worsening or persistent symptoms, and/or ANY NEW OR CONCERNING SYMPTOMS. If you have issues obtaining follow up, please call: [phone] to obtain a doctor or specialist who takes your insurance in your area.  You may call [phone] to make an appointment with the internal medicine clinic.

## 2020-12-16 NOTE — ED ADULT TRIAGE NOTE - CHIEF COMPLAINT QUOTE
Pt c/o pelvic area pain  x 1 week. Pt with recent positive pregnancy test.  Denies back pain. vag bleed

## 2020-12-16 NOTE — ED PROVIDER NOTE - OBJECTIVE STATEMENT
31 y/o F, , 8 weeks gestation based on LMP 10/21, presents to the ED w/ R sided pelvic pain xfew days. Pt was seen at an urgent care last night and was advised to come to the ED, but did not come due to personal reasons. States she is not having any vaginal bleeding, vaginal discharge, flank pain, nausea, vomiting, diarrhea or bloody stools. Pain is intermittent and worse when going from lying down position to seated up position, then pain seems to resolve. Pt is not under the care of an OB at this time. States she tried to make an appointment with our OB unit, but was unable to get an appointment until January. Denies any fever, chills, chest pain, shortness of breath or prior ectopic pregnancies. Pt had a positive pregnancy test at urgent care yesterday.

## 2020-12-16 NOTE — ED PROVIDER NOTE - PATIENT PORTAL LINK FT
You can access the FollowMyHealth Patient Portal offered by [facility] by registering at the following website: [email or web address]. By joining [email or web address]’s FollowMyHealth portal, you will also be able to view your health information using other applications (apps) compatible with our system.

## 2020-12-16 NOTE — ED PROVIDER NOTE - NSFOLLOWUPCLINICS_GEN_ALL_ED_FT
[facility]  OB/GYN & Surg  [phone] [place] [address]  Phone: [phone]  Fax:   Follow Up Time:

## 2020-12-16 NOTE — ED PROVIDER NOTE - CHPI ED SYMPTOMS NEG
no vaginal bleeding, no vaginal discharge, no flank pain, no chest pain, no shortness of breath/no blood in stool/no diarrhea/no fever/no nausea/no vomiting/no chills

## 2020-12-16 NOTE — ED ADULT NURSE REASSESSMENT NOTE - NS ED NURSE REASSESS COMMENT FT1
Patient awake and alert, resting comfortably at this time, IV placed, labs sent ,NS infusing well, awaiting sonogram.

## 2020-12-16 NOTE — ED PROVIDER NOTE - CPE EDP EYES NORM
ambulatory, complaining of nosebleed which started at 5am today. States it's been going on for the past three days now. Denies use of blood thinners. Currently denies c/p, dizziness and headache. normal...

## 2020-12-16 NOTE — ED PROVIDER NOTE - ATTENDING CONTRIBUTION TO CARE
30 year old with lower abd cramping and recent + home preg test. rule out ectopic vs early pregnancy vs threatened ab. labs hcg sono reassess

## 2020-12-16 NOTE — ED ADULT NURSE NOTE - OBJECTIVE STATEMENT
Patient with positive pregnancy test , c/o lower right sided abdominal pain ,on and off x a week, denies any vaginal bleeding.  LMP October , sent to the er for evaluation.

## 2020-12-17 LAB
CULTURE RESULTS: SIGNIFICANT CHANGE UP
SPECIMEN SOURCE: SIGNIFICANT CHANGE UP

## 2021-01-27 ENCOUNTER — RESULT REVIEW (OUTPATIENT)
Age: 31
End: 2021-01-27

## 2021-01-27 ENCOUNTER — APPOINTMENT (OUTPATIENT)
Dept: OBGYN | Facility: HOSPITAL | Age: 31
End: 2021-01-27

## 2021-01-27 ENCOUNTER — NON-APPOINTMENT (OUTPATIENT)
Age: 31
End: 2021-01-27

## 2021-01-27 ENCOUNTER — OUTPATIENT (OUTPATIENT)
Dept: OUTPATIENT SERVICES | Facility: HOSPITAL | Age: 31
LOS: 1 days | End: 2021-01-27

## 2021-01-27 VITALS — SYSTOLIC BLOOD PRESSURE: 123 MMHG | BODY MASS INDEX: 42.87 KG/M2 | WEIGHT: 242 LBS | DIASTOLIC BLOOD PRESSURE: 84 MMHG

## 2021-01-27 VITALS — TEMPERATURE: 98.2 F

## 2021-01-27 DIAGNOSIS — Z30.017 ENCOUNTER FOR INITIAL PRESCRIPTION OF IMPLANTABLE SUBDERMAL CONTRACEPTIVE: ICD-10-CM

## 2021-01-27 DIAGNOSIS — Z82.49 FAMILY HISTORY OF ISCHEMIC HEART DISEASE AND OTHER DISEASES OF THE CIRCULATORY SYSTEM: ICD-10-CM

## 2021-01-27 DIAGNOSIS — Z87.39 PERSONAL HISTORY OF OTHER DISEASES OF THE MUSCULOSKELETAL SYSTEM AND CONNECTIVE TISSUE: ICD-10-CM

## 2021-01-27 DIAGNOSIS — Z30.011 ENCOUNTER FOR INITIAL PRESCRIPTION OF CONTRACEPTIVE PILLS: ICD-10-CM

## 2021-01-27 DIAGNOSIS — Z30.46 ENCOUNTER FOR SURVEILLANCE OF IMPLANTABLE SUBDERMAL CONTRACEPTIVE: ICD-10-CM

## 2021-01-27 DIAGNOSIS — R22.9 LOCALIZED SWELLING, MASS AND LUMP, UNSPECIFIED: Chronic | ICD-10-CM

## 2021-01-27 DIAGNOSIS — R58 HEMORRHAGE, NOT ELSEWHERE CLASSIFIED: Chronic | ICD-10-CM

## 2021-01-27 LAB
A1C WITH ESTIMATED AVERAGE GLUCOSE RESULT: 4.6 % — SIGNIFICANT CHANGE UP (ref 4–5.6)
ALBUMIN SERPL ELPH-MCNC: 4.3 G/DL — SIGNIFICANT CHANGE UP (ref 3.3–5)
ALP SERPL-CCNC: 69 U/L — SIGNIFICANT CHANGE UP (ref 40–120)
ALT FLD-CCNC: 14 U/L — SIGNIFICANT CHANGE UP (ref 4–33)
ANION GAP SERPL CALC-SCNC: 14 MMOL/L — SIGNIFICANT CHANGE UP (ref 7–14)
APPEARANCE UR: CLEAR — SIGNIFICANT CHANGE UP
AST SERPL-CCNC: 12 U/L — SIGNIFICANT CHANGE UP (ref 4–32)
BACTERIA # UR AUTO: SIGNIFICANT CHANGE UP
BASOPHILS # BLD AUTO: 0.02 K/UL — SIGNIFICANT CHANGE UP (ref 0–0.2)
BASOPHILS NFR BLD AUTO: 0.2 % — SIGNIFICANT CHANGE UP (ref 0–2)
BILIRUB SERPL-MCNC: 0.4 MG/DL — SIGNIFICANT CHANGE UP (ref 0.2–1.2)
BILIRUB UR-MCNC: NEGATIVE — SIGNIFICANT CHANGE UP
BUN SERPL-MCNC: 4 MG/DL — LOW (ref 7–23)
CALCIUM SERPL-MCNC: 8.9 MG/DL — SIGNIFICANT CHANGE UP (ref 8.4–10.5)
CHLORIDE SERPL-SCNC: 98 MMOL/L — SIGNIFICANT CHANGE UP (ref 98–107)
CO2 SERPL-SCNC: 24 MMOL/L — SIGNIFICANT CHANGE UP (ref 22–31)
COLOR SPEC: YELLOW — SIGNIFICANT CHANGE UP
CREAT SERPL-MCNC: 0.58 MG/DL — SIGNIFICANT CHANGE UP (ref 0.5–1.3)
DIFF PNL FLD: NEGATIVE — SIGNIFICANT CHANGE UP
EOSINOPHIL # BLD AUTO: 0.06 K/UL — SIGNIFICANT CHANGE UP (ref 0–0.5)
EOSINOPHIL NFR BLD AUTO: 0.5 % — SIGNIFICANT CHANGE UP (ref 0–6)
EPI CELLS # UR: 1 /HPF — SIGNIFICANT CHANGE UP (ref 0–5)
ESTIMATED AVERAGE GLUCOSE: 85 MG/DL — SIGNIFICANT CHANGE UP (ref 68–114)
GLUCOSE SERPL-MCNC: 74 MG/DL — SIGNIFICANT CHANGE UP (ref 70–99)
GLUCOSE UR QL: NEGATIVE — SIGNIFICANT CHANGE UP
HCT VFR BLD CALC: 40.7 % — SIGNIFICANT CHANGE UP (ref 34.5–45)
HGB BLD-MCNC: 13 G/DL — SIGNIFICANT CHANGE UP (ref 11.5–15.5)
HIV 1+2 AB+HIV1 P24 AG SERPL QL IA: SIGNIFICANT CHANGE UP
HYALINE CASTS # UR AUTO: 2 /LPF — SIGNIFICANT CHANGE UP (ref 0–7)
IANC: 8.42 K/UL — SIGNIFICANT CHANGE UP (ref 1.5–8.5)
IMM GRANULOCYTES NFR BLD AUTO: 0.6 % — SIGNIFICANT CHANGE UP (ref 0–1.5)
KETONES UR-MCNC: NEGATIVE — SIGNIFICANT CHANGE UP
LEUKOCYTE ESTERASE UR-ACNC: NEGATIVE — SIGNIFICANT CHANGE UP
LYMPHOCYTES # BLD AUTO: 2.79 K/UL — SIGNIFICANT CHANGE UP (ref 1–3.3)
LYMPHOCYTES # BLD AUTO: 23.6 % — SIGNIFICANT CHANGE UP (ref 13–44)
MCHC RBC-ENTMCNC: 31.2 PG — SIGNIFICANT CHANGE UP (ref 27–34)
MCHC RBC-ENTMCNC: 31.9 GM/DL — LOW (ref 32–36)
MCV RBC AUTO: 97.6 FL — SIGNIFICANT CHANGE UP (ref 80–100)
MONOCYTES # BLD AUTO: 0.48 K/UL — SIGNIFICANT CHANGE UP (ref 0–0.9)
MONOCYTES NFR BLD AUTO: 4.1 % — SIGNIFICANT CHANGE UP (ref 2–14)
NEUTROPHILS # BLD AUTO: 8.42 K/UL — HIGH (ref 1.8–7.4)
NEUTROPHILS NFR BLD AUTO: 71 % — SIGNIFICANT CHANGE UP (ref 43–77)
NITRITE UR-MCNC: NEGATIVE — SIGNIFICANT CHANGE UP
NRBC # BLD: 0 /100 WBCS — SIGNIFICANT CHANGE UP
NRBC # FLD: 0 K/UL — SIGNIFICANT CHANGE UP
PH UR: 6 — SIGNIFICANT CHANGE UP (ref 5–8)
PLATELET # BLD AUTO: 293 K/UL — SIGNIFICANT CHANGE UP (ref 150–400)
POTASSIUM SERPL-MCNC: 3.4 MMOL/L — LOW (ref 3.5–5.3)
POTASSIUM SERPL-SCNC: 3.4 MMOL/L — LOW (ref 3.5–5.3)
PROT SERPL-MCNC: 7.3 G/DL — SIGNIFICANT CHANGE UP (ref 6–8.3)
PROT UR-MCNC: ABNORMAL
RBC # BLD: 4.17 M/UL — SIGNIFICANT CHANGE UP (ref 3.8–5.2)
RBC # FLD: 13.2 % — SIGNIFICANT CHANGE UP (ref 10.3–14.5)
RBC CASTS # UR COMP ASSIST: 2 /HPF — SIGNIFICANT CHANGE UP (ref 0–4)
SARS-COV-2 IGG SERPL QL IA: POSITIVE
SARS-COV-2 IGM SERPL IA-ACNC: 21.6 INDEX — HIGH
SODIUM SERPL-SCNC: 136 MMOL/L — SIGNIFICANT CHANGE UP (ref 135–145)
SP GR SPEC: 1.02 — SIGNIFICANT CHANGE UP (ref 1.01–1.02)
URATE SERPL-MCNC: 2.4 MG/DL — LOW (ref 2.5–7)
UROBILINOGEN FLD QL: SIGNIFICANT CHANGE UP
WBC # BLD: 11.84 K/UL — HIGH (ref 3.8–10.5)
WBC # FLD AUTO: 11.84 K/UL — HIGH (ref 3.8–10.5)
WBC UR QL: SIGNIFICANT CHANGE UP /HPF (ref 0–5)

## 2021-01-27 RX ORDER — INSULIN LISPRO 100 U/ML
100 INJECTION, SOLUTION SUBCUTANEOUS
Qty: 1 | Refills: 2 | Status: DISCONTINUED | COMMUNITY
Start: 2019-03-27 | End: 2021-01-27

## 2021-01-27 RX ORDER — LANCETS
EACH MISCELLANEOUS
Qty: 1 | Refills: 5 | Status: DISCONTINUED | COMMUNITY
Start: 2019-02-19 | End: 2021-01-27

## 2021-01-27 RX ORDER — ISOPROPYL ALCOHOL 70 ML/100ML
SWAB TOPICAL
Qty: 1 | Refills: 1 | Status: DISCONTINUED | COMMUNITY
Start: 2019-02-11 | End: 2021-01-27

## 2021-01-27 RX ORDER — ETONOGESTREL 68 MG/1
68 IMPLANT SUBCUTANEOUS
Refills: 0 | Status: DISCONTINUED | COMMUNITY
Start: 2019-07-02 | End: 2021-01-27

## 2021-01-27 RX ORDER — BLOOD SUGAR DIAGNOSTIC
STRIP MISCELLANEOUS
Qty: 200 | Refills: 3 | Status: DISCONTINUED | COMMUNITY
Start: 2019-02-19 | End: 2021-01-27

## 2021-01-27 RX ORDER — LEVONORGESTREL AND ETHINYL ESTRADIOL 0.1-0.02MG
0.1-2 KIT ORAL DAILY
Qty: 1 | Refills: 2 | Status: DISCONTINUED | COMMUNITY
Start: 2020-04-14 | End: 2021-01-27

## 2021-01-27 RX ORDER — BLOOD SUGAR DIAGNOSTIC
STRIP MISCELLANEOUS
Qty: 200 | Refills: 5 | Status: DISCONTINUED | COMMUNITY
Start: 2019-02-21 | End: 2021-01-27

## 2021-01-27 RX ORDER — PEN NEEDLE, DIABETIC 29 G X1/2"
33G X 4 MM NEEDLE, DISPOSABLE MISCELLANEOUS
Qty: 1 | Refills: 3 | Status: DISCONTINUED | COMMUNITY
Start: 2019-02-19 | End: 2021-01-27

## 2021-01-27 RX ORDER — INSULIN GLARGINE 100 [IU]/ML
100 INJECTION, SOLUTION SUBCUTANEOUS
Qty: 1 | Refills: 2 | Status: DISCONTINUED | COMMUNITY
Start: 2019-03-27 | End: 2021-01-27

## 2021-01-28 ENCOUNTER — NON-APPOINTMENT (OUTPATIENT)
Age: 31
End: 2021-01-28

## 2021-01-28 DIAGNOSIS — Z34.90 ENCOUNTER FOR SUPERVISION OF NORMAL PREGNANCY, UNSPECIFIED, UNSPECIFIED TRIMESTER: ICD-10-CM

## 2021-01-28 DIAGNOSIS — Z34.01 ENCOUNTER FOR SUPERVISION OF NORMAL FIRST PREGNANCY, FIRST TRIMESTER: ICD-10-CM

## 2021-01-28 LAB
24R-OH-CALCIDIOL SERPL-MCNC: 14.5 NG/ML — LOW (ref 30–80)
C TRACH RRNA SPEC QL NAA+PROBE: SIGNIFICANT CHANGE UP
CULTURE RESULTS: SIGNIFICANT CHANGE UP
HBV SURFACE AG SER-ACNC: SIGNIFICANT CHANGE UP
HCV AB S/CO SERPL IA: 0.1 S/CO — SIGNIFICANT CHANGE UP (ref 0–0.99)
HCV AB SERPL-IMP: SIGNIFICANT CHANGE UP
HPV HIGH+LOW RISK DNA PNL CVX: SIGNIFICANT CHANGE UP
LEAD BLD-MCNC: <1 UG/DL — SIGNIFICANT CHANGE UP (ref 0–4)
MEV IGG SER-ACNC: 59.8 AU/ML — SIGNIFICANT CHANGE UP
MEV IGG+IGM SER-IMP: POSITIVE — SIGNIFICANT CHANGE UP
N GONORRHOEA RRNA SPEC QL NAA+PROBE: SIGNIFICANT CHANGE UP
RUBV IGG SER-ACNC: 4.4 INDEX — SIGNIFICANT CHANGE UP
RUBV IGG SER-IMP: POSITIVE — SIGNIFICANT CHANGE UP
SPECIMEN SOURCE: SIGNIFICANT CHANGE UP
SPECIMEN SOURCE: SIGNIFICANT CHANGE UP
T PALLIDUM AB TITR SER: NEGATIVE — SIGNIFICANT CHANGE UP
VZV IGG FLD QL IA: 59.1 INDEX — SIGNIFICANT CHANGE UP
VZV IGG FLD QL IA: NEGATIVE — SIGNIFICANT CHANGE UP

## 2021-01-28 RX ORDER — UBIDECARENONE/VIT E ACET 100MG-5
50 MCG CAPSULE ORAL
Qty: 30 | Refills: 3 | Status: ACTIVE | COMMUNITY
Start: 2021-01-28 | End: 1900-01-01

## 2021-01-29 ENCOUNTER — RESULT REVIEW (OUTPATIENT)
Age: 31
End: 2021-01-29

## 2021-01-29 ENCOUNTER — APPOINTMENT (OUTPATIENT)
Dept: ANTEPARTUM | Facility: CLINIC | Age: 31
End: 2021-01-29
Payer: MEDICAID

## 2021-01-29 ENCOUNTER — ASOB RESULT (OUTPATIENT)
Age: 31
End: 2021-01-29

## 2021-01-29 LAB
GAMMA INTERFERON BACKGROUND BLD IA-ACNC: 0.06 IU/ML — SIGNIFICANT CHANGE UP
M TB IFN-G BLD-IMP: NEGATIVE — SIGNIFICANT CHANGE UP
M TB IFN-G CD4+ BCKGRND COR BLD-ACNC: -0.01 IU/ML — SIGNIFICANT CHANGE UP
M TB IFN-G CD4+CD8+ BCKGRND COR BLD-ACNC: 0 IU/ML — SIGNIFICANT CHANGE UP
QUANT TB PLUS MITOGEN MINUS NIL: 8.49 IU/ML — SIGNIFICANT CHANGE UP

## 2021-01-29 PROCEDURE — 76801 OB US < 14 WKS SINGLE FETUS: CPT | Mod: 26

## 2021-01-29 PROCEDURE — 36416 COLLJ CAPILLARY BLOOD SPEC: CPT

## 2021-01-29 PROCEDURE — 76813 OB US NUCHAL MEAS 1 GEST: CPT | Mod: 26

## 2021-01-30 LAB — CYTOLOGY SPEC DOC CYTO: SIGNIFICANT CHANGE UP

## 2021-02-01 LAB
1ST TRIMESTER DATA: SIGNIFICANT CHANGE UP
ADDENDUM DOC: SIGNIFICANT CHANGE UP
AFP SERPL-ACNC: SIGNIFICANT CHANGE UP
B-HCG FREE SERPL-MCNC: SIGNIFICANT CHANGE UP
CLINICAL BIOCHEMIST REVIEW: SIGNIFICANT CHANGE UP
CLINICAL BIOCHEMIST REVIEW: SIGNIFICANT CHANGE UP
DEMOGRAPHIC DATA: SIGNIFICANT CHANGE UP
NASAL BONE: PRESENT — SIGNIFICANT CHANGE UP
NT: SIGNIFICANT CHANGE UP
PAPP-A SERPL-ACNC: SIGNIFICANT CHANGE UP
SCREEN-FOOTER: SIGNIFICANT CHANGE UP
SCREEN-RECOMMENDATIONS: SIGNIFICANT CHANGE UP

## 2021-02-24 ENCOUNTER — NON-APPOINTMENT (OUTPATIENT)
Age: 31
End: 2021-02-24

## 2021-02-25 ENCOUNTER — NON-APPOINTMENT (OUTPATIENT)
Age: 31
End: 2021-02-25

## 2021-02-25 ENCOUNTER — APPOINTMENT (OUTPATIENT)
Dept: OBGYN | Facility: HOSPITAL | Age: 31
End: 2021-02-25

## 2021-02-25 ENCOUNTER — RESULT REVIEW (OUTPATIENT)
Age: 31
End: 2021-02-25

## 2021-02-25 ENCOUNTER — OUTPATIENT (OUTPATIENT)
Dept: OUTPATIENT SERVICES | Facility: HOSPITAL | Age: 31
LOS: 1 days | End: 2021-02-25

## 2021-02-25 VITALS
HEIGHT: 63 IN | HEART RATE: 109 BPM | WEIGHT: 243 LBS | BODY MASS INDEX: 43.05 KG/M2 | DIASTOLIC BLOOD PRESSURE: 74 MMHG | TEMPERATURE: 99 F | SYSTOLIC BLOOD PRESSURE: 107 MMHG

## 2021-02-25 DIAGNOSIS — Z34.01 ENCOUNTER FOR SUPERVISION OF NORMAL FIRST PREGNANCY, FIRST TRIMESTER: ICD-10-CM

## 2021-02-25 DIAGNOSIS — R22.9 LOCALIZED SWELLING, MASS AND LUMP, UNSPECIFIED: Chronic | ICD-10-CM

## 2021-02-25 DIAGNOSIS — R58 HEMORRHAGE, NOT ELSEWHERE CLASSIFIED: Chronic | ICD-10-CM

## 2021-03-01 DIAGNOSIS — Z34.82 ENCOUNTER FOR SUPERVISION OF OTHER NORMAL PREGNANCY, SECOND TRIMESTER: ICD-10-CM

## 2021-03-18 ENCOUNTER — NON-APPOINTMENT (OUTPATIENT)
Age: 31
End: 2021-03-18

## 2021-03-19 ENCOUNTER — APPOINTMENT (OUTPATIENT)
Dept: ANTEPARTUM | Facility: CLINIC | Age: 31
End: 2021-03-19
Payer: MEDICAID

## 2021-03-19 ENCOUNTER — ASOB RESULT (OUTPATIENT)
Age: 31
End: 2021-03-19

## 2021-03-19 ENCOUNTER — APPOINTMENT (OUTPATIENT)
Dept: OBGYN | Facility: HOSPITAL | Age: 31
End: 2021-03-19

## 2021-03-19 PROCEDURE — 76811 OB US DETAILED SNGL FETUS: CPT | Mod: 26

## 2021-03-29 ENCOUNTER — NON-APPOINTMENT (OUTPATIENT)
Age: 31
End: 2021-03-29

## 2021-03-30 ENCOUNTER — NON-APPOINTMENT (OUTPATIENT)
Age: 31
End: 2021-03-30

## 2021-03-30 ENCOUNTER — OUTPATIENT (OUTPATIENT)
Dept: OUTPATIENT SERVICES | Facility: HOSPITAL | Age: 31
LOS: 1 days | End: 2021-03-30

## 2021-03-30 ENCOUNTER — APPOINTMENT (OUTPATIENT)
Dept: OBGYN | Facility: HOSPITAL | Age: 31
End: 2021-03-30

## 2021-03-30 VITALS
TEMPERATURE: 98.3 F | WEIGHT: 249 LBS | BODY MASS INDEX: 44.11 KG/M2 | DIASTOLIC BLOOD PRESSURE: 67 MMHG | SYSTOLIC BLOOD PRESSURE: 91 MMHG

## 2021-03-30 DIAGNOSIS — R58 HEMORRHAGE, NOT ELSEWHERE CLASSIFIED: Chronic | ICD-10-CM

## 2021-03-30 DIAGNOSIS — R22.9 LOCALIZED SWELLING, MASS AND LUMP, UNSPECIFIED: Chronic | ICD-10-CM

## 2021-04-06 DIAGNOSIS — R79.89 OTHER SPECIFIED ABNORMAL FINDINGS OF BLOOD CHEMISTRY: ICD-10-CM

## 2021-04-06 DIAGNOSIS — Z34.82 ENCOUNTER FOR SUPERVISION OF OTHER NORMAL PREGNANCY, SECOND TRIMESTER: ICD-10-CM

## 2021-04-06 DIAGNOSIS — E66.9 OBESITY, UNSPECIFIED: ICD-10-CM

## 2021-04-12 ENCOUNTER — APPOINTMENT (OUTPATIENT)
Dept: ANTEPARTUM | Facility: CLINIC | Age: 31
End: 2021-04-12
Payer: MEDICAID

## 2021-04-12 ENCOUNTER — ASOB RESULT (OUTPATIENT)
Age: 31
End: 2021-04-12

## 2021-04-12 PROCEDURE — 76816 OB US FOLLOW-UP PER FETUS: CPT | Mod: 26

## 2021-04-26 ENCOUNTER — NON-APPOINTMENT (OUTPATIENT)
Age: 31
End: 2021-04-26

## 2021-04-27 ENCOUNTER — APPOINTMENT (OUTPATIENT)
Dept: OBGYN | Facility: HOSPITAL | Age: 31
End: 2021-04-27

## 2021-04-27 ENCOUNTER — NON-APPOINTMENT (OUTPATIENT)
Age: 31
End: 2021-04-27

## 2021-04-27 ENCOUNTER — OUTPATIENT (OUTPATIENT)
Dept: OUTPATIENT SERVICES | Facility: HOSPITAL | Age: 31
LOS: 1 days | End: 2021-04-27

## 2021-04-27 VITALS
DIASTOLIC BLOOD PRESSURE: 55 MMHG | SYSTOLIC BLOOD PRESSURE: 100 MMHG | TEMPERATURE: 98.4 F | HEART RATE: 113 BPM | BODY MASS INDEX: 45.18 KG/M2 | HEIGHT: 63 IN | WEIGHT: 255 LBS

## 2021-04-27 DIAGNOSIS — R58 HEMORRHAGE, NOT ELSEWHERE CLASSIFIED: Chronic | ICD-10-CM

## 2021-04-27 DIAGNOSIS — R22.9 LOCALIZED SWELLING, MASS AND LUMP, UNSPECIFIED: Chronic | ICD-10-CM

## 2021-04-27 LAB — GLUCOSE BLDC GLUCOMTR-MCNC: 107

## 2021-04-28 DIAGNOSIS — Z00.00 ENCOUNTER FOR GENERAL ADULT MEDICAL EXAMINATION WITHOUT ABNORMAL FINDINGS: ICD-10-CM

## 2021-04-28 DIAGNOSIS — Z34.82 ENCOUNTER FOR SUPERVISION OF OTHER NORMAL PREGNANCY, SECOND TRIMESTER: ICD-10-CM

## 2021-05-10 ENCOUNTER — NON-APPOINTMENT (OUTPATIENT)
Age: 31
End: 2021-05-10

## 2021-05-11 ENCOUNTER — RESULT REVIEW (OUTPATIENT)
Age: 31
End: 2021-05-11

## 2021-05-11 ENCOUNTER — NON-APPOINTMENT (OUTPATIENT)
Age: 31
End: 2021-05-11

## 2021-05-11 ENCOUNTER — APPOINTMENT (OUTPATIENT)
Dept: OBGYN | Facility: HOSPITAL | Age: 31
End: 2021-05-11

## 2021-05-11 ENCOUNTER — OUTPATIENT (OUTPATIENT)
Dept: OUTPATIENT SERVICES | Facility: HOSPITAL | Age: 31
LOS: 1 days | End: 2021-05-11

## 2021-05-11 VITALS
SYSTOLIC BLOOD PRESSURE: 109 MMHG | BODY MASS INDEX: 44.47 KG/M2 | HEART RATE: 100 BPM | TEMPERATURE: 97.3 F | HEIGHT: 63 IN | DIASTOLIC BLOOD PRESSURE: 77 MMHG | WEIGHT: 251 LBS

## 2021-05-11 DIAGNOSIS — R58 HEMORRHAGE, NOT ELSEWHERE CLASSIFIED: Chronic | ICD-10-CM

## 2021-05-11 DIAGNOSIS — Z34.82 ENCOUNTER FOR SUPERVISION OF OTHER NORMAL PREGNANCY, SECOND TRIMESTER: ICD-10-CM

## 2021-05-11 DIAGNOSIS — R22.9 LOCALIZED SWELLING, MASS AND LUMP, UNSPECIFIED: Chronic | ICD-10-CM

## 2021-05-11 DIAGNOSIS — Z00.00 ENCOUNTER FOR GENERAL ADULT MEDICAL EXAMINATION W/OUT ABNORMAL FINDINGS: ICD-10-CM

## 2021-05-11 LAB
BASOPHILS # BLD AUTO: 0.02 K/UL — SIGNIFICANT CHANGE UP (ref 0–0.2)
BASOPHILS NFR BLD AUTO: 0.2 % — SIGNIFICANT CHANGE UP (ref 0–2)
EOSINOPHIL # BLD AUTO: 0.06 K/UL — SIGNIFICANT CHANGE UP (ref 0–0.5)
EOSINOPHIL NFR BLD AUTO: 0.6 % — SIGNIFICANT CHANGE UP (ref 0–6)
GLUCOSE 1H P MEAL SERPL-MCNC: 134 MG/DL — SIGNIFICANT CHANGE UP (ref 70–134)
HCT VFR BLD CALC: 36.2 % — SIGNIFICANT CHANGE UP (ref 34.5–45)
HGB BLD-MCNC: 11.8 G/DL — SIGNIFICANT CHANGE UP (ref 11.5–15.5)
IANC: 6.9 K/UL — SIGNIFICANT CHANGE UP (ref 1.5–8.5)
IMM GRANULOCYTES NFR BLD AUTO: 0.5 % — SIGNIFICANT CHANGE UP (ref 0–1.5)
LYMPHOCYTES # BLD AUTO: 2.02 K/UL — SIGNIFICANT CHANGE UP (ref 1–3.3)
LYMPHOCYTES # BLD AUTO: 21.3 % — SIGNIFICANT CHANGE UP (ref 13–44)
MCHC RBC-ENTMCNC: 31.4 PG — SIGNIFICANT CHANGE UP (ref 27–34)
MCHC RBC-ENTMCNC: 32.6 GM/DL — SIGNIFICANT CHANGE UP (ref 32–36)
MCV RBC AUTO: 96.3 FL — SIGNIFICANT CHANGE UP (ref 80–100)
MONOCYTES # BLD AUTO: 0.44 K/UL — SIGNIFICANT CHANGE UP (ref 0–0.9)
MONOCYTES NFR BLD AUTO: 4.6 % — SIGNIFICANT CHANGE UP (ref 2–14)
NEUTROPHILS # BLD AUTO: 6.9 K/UL — SIGNIFICANT CHANGE UP (ref 1.8–7.4)
NEUTROPHILS NFR BLD AUTO: 72.8 % — SIGNIFICANT CHANGE UP (ref 43–77)
NRBC # BLD: 0 /100 WBCS — SIGNIFICANT CHANGE UP
NRBC # FLD: 0 K/UL — SIGNIFICANT CHANGE UP
PLATELET # BLD AUTO: 296 K/UL — SIGNIFICANT CHANGE UP (ref 150–400)
RBC # BLD: 3.76 M/UL — LOW (ref 3.8–5.2)
RBC # FLD: 12.8 % — SIGNIFICANT CHANGE UP (ref 10.3–14.5)
WBC # BLD: 9.49 K/UL — SIGNIFICANT CHANGE UP (ref 3.8–10.5)
WBC # FLD AUTO: 9.49 K/UL — SIGNIFICANT CHANGE UP (ref 3.8–10.5)

## 2021-05-12 LAB
24R-OH-CALCIDIOL SERPL-MCNC: 20.4 NG/ML — LOW (ref 30–80)
T PALLIDUM AB TITR SER: NEGATIVE — SIGNIFICANT CHANGE UP

## 2021-05-14 DIAGNOSIS — Z34.83 ENCOUNTER FOR SUPERVISION OF OTHER NORMAL PREGNANCY, THIRD TRIMESTER: ICD-10-CM

## 2021-05-17 ENCOUNTER — ASOB RESULT (OUTPATIENT)
Age: 31
End: 2021-05-17

## 2021-05-17 ENCOUNTER — APPOINTMENT (OUTPATIENT)
Dept: ANTEPARTUM | Facility: CLINIC | Age: 31
End: 2021-05-17
Payer: MEDICAID

## 2021-05-17 PROCEDURE — 76816 OB US FOLLOW-UP PER FETUS: CPT | Mod: 26

## 2021-05-17 PROCEDURE — 76819 FETAL BIOPHYS PROFIL W/O NST: CPT | Mod: 26

## 2021-06-02 ENCOUNTER — NON-APPOINTMENT (OUTPATIENT)
Age: 31
End: 2021-06-02

## 2021-06-02 ENCOUNTER — RESULT CHARGE (OUTPATIENT)
Age: 31
End: 2021-06-02

## 2021-06-02 ENCOUNTER — APPOINTMENT (OUTPATIENT)
Dept: OBGYN | Facility: HOSPITAL | Age: 31
End: 2021-06-02

## 2021-06-02 ENCOUNTER — OUTPATIENT (OUTPATIENT)
Dept: OUTPATIENT SERVICES | Facility: HOSPITAL | Age: 31
LOS: 1 days | End: 2021-06-02

## 2021-06-02 VITALS
WEIGHT: 252 LBS | HEART RATE: 108 BPM | TEMPERATURE: 97.5 F | BODY MASS INDEX: 44.64 KG/M2 | DIASTOLIC BLOOD PRESSURE: 63 MMHG | SYSTOLIC BLOOD PRESSURE: 105 MMHG

## 2021-06-02 DIAGNOSIS — R81 GLYCOSURIA: ICD-10-CM

## 2021-06-02 DIAGNOSIS — R79.89 OTHER SPECIFIED ABNORMAL FINDINGS OF BLOOD CHEMISTRY: ICD-10-CM

## 2021-06-02 DIAGNOSIS — R22.9 LOCALIZED SWELLING, MASS AND LUMP, UNSPECIFIED: Chronic | ICD-10-CM

## 2021-06-02 DIAGNOSIS — R58 HEMORRHAGE, NOT ELSEWHERE CLASSIFIED: Chronic | ICD-10-CM

## 2021-06-02 LAB — GLUCOSE BLDC GLUCOMTR-MCNC: 93

## 2021-06-02 RX ORDER — MELATONIN 3 MG
25 MCG TABLET ORAL
Qty: 30 | Refills: 5 | Status: ACTIVE | COMMUNITY
Start: 2021-06-02 | End: 1900-01-01

## 2021-06-04 DIAGNOSIS — R81 GLYCOSURIA: ICD-10-CM

## 2021-06-04 DIAGNOSIS — E66.9 OBESITY, UNSPECIFIED: ICD-10-CM

## 2021-06-04 DIAGNOSIS — Z34.83 ENCOUNTER FOR SUPERVISION OF OTHER NORMAL PREGNANCY, THIRD TRIMESTER: ICD-10-CM

## 2021-06-04 DIAGNOSIS — R79.89 OTHER SPECIFIED ABNORMAL FINDINGS OF BLOOD CHEMISTRY: ICD-10-CM

## 2021-06-16 ENCOUNTER — NON-APPOINTMENT (OUTPATIENT)
Age: 31
End: 2021-06-16

## 2021-06-17 ENCOUNTER — APPOINTMENT (OUTPATIENT)
Dept: OBGYN | Facility: HOSPITAL | Age: 31
End: 2021-06-17

## 2021-06-17 ENCOUNTER — OUTPATIENT (OUTPATIENT)
Dept: OUTPATIENT SERVICES | Facility: HOSPITAL | Age: 31
LOS: 1 days | End: 2021-06-17

## 2021-06-17 ENCOUNTER — NON-APPOINTMENT (OUTPATIENT)
Age: 31
End: 2021-06-17

## 2021-06-17 ENCOUNTER — APPOINTMENT (OUTPATIENT)
Dept: ANTEPARTUM | Facility: CLINIC | Age: 31
End: 2021-06-17
Payer: MEDICAID

## 2021-06-17 ENCOUNTER — ASOB RESULT (OUTPATIENT)
Age: 31
End: 2021-06-17

## 2021-06-17 VITALS
SYSTOLIC BLOOD PRESSURE: 126 MMHG | HEART RATE: 112 BPM | TEMPERATURE: 98.2 F | HEIGHT: 63 IN | BODY MASS INDEX: 44.83 KG/M2 | DIASTOLIC BLOOD PRESSURE: 72 MMHG | WEIGHT: 253 LBS

## 2021-06-17 DIAGNOSIS — R22.9 LOCALIZED SWELLING, MASS AND LUMP, UNSPECIFIED: Chronic | ICD-10-CM

## 2021-06-17 DIAGNOSIS — R58 HEMORRHAGE, NOT ELSEWHERE CLASSIFIED: Chronic | ICD-10-CM

## 2021-06-17 PROCEDURE — 76816 OB US FOLLOW-UP PER FETUS: CPT | Mod: 26

## 2021-06-17 PROCEDURE — 76819 FETAL BIOPHYS PROFIL W/O NST: CPT | Mod: 26

## 2021-06-21 ENCOUNTER — NON-APPOINTMENT (OUTPATIENT)
Age: 31
End: 2021-06-21

## 2021-06-22 ENCOUNTER — NON-APPOINTMENT (OUTPATIENT)
Age: 31
End: 2021-06-22

## 2021-06-22 ENCOUNTER — RESULT REVIEW (OUTPATIENT)
Age: 31
End: 2021-06-22

## 2021-06-22 ENCOUNTER — OUTPATIENT (OUTPATIENT)
Dept: OUTPATIENT SERVICES | Facility: HOSPITAL | Age: 31
LOS: 1 days | End: 2021-06-22

## 2021-06-22 ENCOUNTER — APPOINTMENT (OUTPATIENT)
Dept: OBGYN | Facility: HOSPITAL | Age: 31
End: 2021-06-22

## 2021-06-22 VITALS
HEIGHT: 63 IN | TEMPERATURE: 98.2 F | HEART RATE: 112 BPM | SYSTOLIC BLOOD PRESSURE: 117 MMHG | BODY MASS INDEX: 45.18 KG/M2 | WEIGHT: 255 LBS | DIASTOLIC BLOOD PRESSURE: 72 MMHG

## 2021-06-22 DIAGNOSIS — Z34.83 ENCOUNTER FOR SUPERVISION OF OTHER NORMAL PREGNANCY, THIRD TRIMESTER: ICD-10-CM

## 2021-06-22 DIAGNOSIS — R58 HEMORRHAGE, NOT ELSEWHERE CLASSIFIED: Chronic | ICD-10-CM

## 2021-06-22 DIAGNOSIS — R22.9 LOCALIZED SWELLING, MASS AND LUMP, UNSPECIFIED: Chronic | ICD-10-CM

## 2021-06-22 DIAGNOSIS — Z34.90 ENCOUNTER FOR SUPERVISION OF NORMAL PREGNANCY, UNSPECIFIED, UNSPECIFIED TRIMESTER: ICD-10-CM

## 2021-06-22 LAB
CREAT ?TM UR-MCNC: 165 MG/DL — SIGNIFICANT CHANGE UP
GLUCOSE BLDC GLUCOMTR-MCNC: 180
PROT ?TM UR-MCNC: 31 MG/DL — SIGNIFICANT CHANGE UP
PROT/CREAT UR-RTO: 0.2 RATIO — SIGNIFICANT CHANGE UP (ref 0–0.2)

## 2021-06-24 ENCOUNTER — NON-APPOINTMENT (OUTPATIENT)
Age: 31
End: 2021-06-24

## 2021-06-24 DIAGNOSIS — O24.419 GESTATIONAL DIABETES MELLITUS IN PREGNANCY, UNSPECIFIED CONTROL: ICD-10-CM

## 2021-06-24 DIAGNOSIS — R81 GLYCOSURIA: ICD-10-CM

## 2021-06-24 DIAGNOSIS — O12.10 GESTATIONAL PROTEINURIA, UNSPECIFIED TRIMESTER: ICD-10-CM

## 2021-06-28 ENCOUNTER — NON-APPOINTMENT (OUTPATIENT)
Age: 31
End: 2021-06-28

## 2021-06-28 ENCOUNTER — OUTPATIENT (OUTPATIENT)
Dept: OUTPATIENT SERVICES | Facility: HOSPITAL | Age: 31
LOS: 1 days | End: 2021-06-28

## 2021-06-28 ENCOUNTER — APPOINTMENT (OUTPATIENT)
Dept: OBGYN | Facility: HOSPITAL | Age: 31
End: 2021-06-28
Payer: MEDICAID

## 2021-06-28 VITALS
RESPIRATION RATE: 20 BRPM | TEMPERATURE: 97.6 F | HEART RATE: 106 BPM | SYSTOLIC BLOOD PRESSURE: 99 MMHG | WEIGHT: 254 LBS | DIASTOLIC BLOOD PRESSURE: 80 MMHG | BODY MASS INDEX: 44.99 KG/M2

## 2021-06-28 DIAGNOSIS — R58 HEMORRHAGE, NOT ELSEWHERE CLASSIFIED: Chronic | ICD-10-CM

## 2021-06-28 DIAGNOSIS — R22.9 LOCALIZED SWELLING, MASS AND LUMP, UNSPECIFIED: Chronic | ICD-10-CM

## 2021-06-28 DIAGNOSIS — Z00.00 ENCOUNTER FOR GENERAL ADULT MEDICAL EXAMINATION WITHOUT ABNORMAL FINDINGS: ICD-10-CM

## 2021-06-28 PROCEDURE — 99213 OFFICE O/P EST LOW 20 MIN: CPT | Mod: 25

## 2021-06-28 RX ORDER — PEN NEEDLE, DIABETIC 29 G X1/2"
32G X 4 MM NEEDLE, DISPOSABLE MISCELLANEOUS
Qty: 1 | Refills: 0 | Status: ACTIVE | COMMUNITY
Start: 2021-06-28 | End: 1900-01-01

## 2021-06-28 RX ORDER — INSULIN HUMAN 100 [IU]/ML
100 INJECTION, SUSPENSION SUBCUTANEOUS
Qty: 1 | Refills: 0 | Status: ACTIVE | COMMUNITY
Start: 2021-06-28 | End: 1900-01-01

## 2021-06-29 DIAGNOSIS — E66.9 OBESITY, UNSPECIFIED: ICD-10-CM

## 2021-06-29 DIAGNOSIS — O24.419 GESTATIONAL DIABETES MELLITUS IN PREGNANCY, UNSPECIFIED CONTROL: ICD-10-CM

## 2021-06-29 DIAGNOSIS — O09.93 SUPERVISION OF HIGH RISK PREGNANCY, UNSPECIFIED, THIRD TRIMESTER: ICD-10-CM

## 2021-07-06 ENCOUNTER — NON-APPOINTMENT (OUTPATIENT)
Age: 31
End: 2021-07-06

## 2021-07-06 RX ORDER — LANCETS
EACH MISCELLANEOUS
Qty: 2 | Refills: 4 | Status: ACTIVE | COMMUNITY
Start: 2021-07-06 | End: 1900-01-01

## 2021-07-06 RX ORDER — BLOOD SUGAR DIAGNOSTIC
STRIP MISCELLANEOUS 4 TIMES DAILY
Qty: 1 | Refills: 2 | Status: ACTIVE | COMMUNITY
Start: 2021-07-06 | End: 1900-01-01

## 2021-07-08 ENCOUNTER — NON-APPOINTMENT (OUTPATIENT)
Age: 31
End: 2021-07-08

## 2021-07-09 LAB
1ST TRIMESTER DATA: SIGNIFICANT CHANGE UP
2ND TRIMESTER DATA: SIGNIFICANT CHANGE UP
ADDENDUM DOC: SIGNIFICANT CHANGE UP
AFP SERPL-ACNC: SIGNIFICANT CHANGE UP
AFP SERPL-ACNC: SIGNIFICANT CHANGE UP
B-HCG FREE SERPL-MCNC: SIGNIFICANT CHANGE UP
B-HCG FREE SERPL-MCNC: SIGNIFICANT CHANGE UP
CLINICAL BIOCHEMIST REVIEW: SIGNIFICANT CHANGE UP
DEMOGRAPHIC DATA: SIGNIFICANT CHANGE UP
INHIBIN A SERPL-MCNC: SIGNIFICANT CHANGE UP
NASAL BONE: PRESENT — SIGNIFICANT CHANGE UP
NT: SIGNIFICANT CHANGE UP
PAPP-A SERPL-ACNC: SIGNIFICANT CHANGE UP
SCREEN-FOOTER: SIGNIFICANT CHANGE UP
U ESTRIOL SERPL-SCNC: SIGNIFICANT CHANGE UP

## 2021-07-11 ENCOUNTER — OUTPATIENT (OUTPATIENT)
Dept: INPATIENT UNIT | Facility: HOSPITAL | Age: 31
LOS: 1 days | Discharge: HOME | End: 2021-07-11
Payer: MEDICAID

## 2021-07-11 ENCOUNTER — EMERGENCY (EMERGENCY)
Facility: HOSPITAL | Age: 31
LOS: 1 days | Discharge: STILL ADMITTED OR ADMINISTRATIVE | End: 2021-07-11
Admitting: EMERGENCY MEDICINE
Payer: MEDICAID

## 2021-07-11 VITALS
HEART RATE: 110 BPM | SYSTOLIC BLOOD PRESSURE: 127 MMHG | DIASTOLIC BLOOD PRESSURE: 69 MMHG | HEIGHT: 63 IN | OXYGEN SATURATION: 100 % | RESPIRATION RATE: 18 BRPM

## 2021-07-11 VITALS — HEART RATE: 112 BPM | SYSTOLIC BLOOD PRESSURE: 126 MMHG | DIASTOLIC BLOOD PRESSURE: 66 MMHG

## 2021-07-11 VITALS — DIASTOLIC BLOOD PRESSURE: 54 MMHG | HEART RATE: 101 BPM | SYSTOLIC BLOOD PRESSURE: 99 MMHG

## 2021-07-11 DIAGNOSIS — O26.899 OTHER SPECIFIED PREGNANCY RELATED CONDITIONS, UNSPECIFIED TRIMESTER: ICD-10-CM

## 2021-07-11 DIAGNOSIS — Z3A.00 WEEKS OF GESTATION OF PREGNANCY NOT SPECIFIED: ICD-10-CM

## 2021-07-11 DIAGNOSIS — R58 HEMORRHAGE, NOT ELSEWHERE CLASSIFIED: Chronic | ICD-10-CM

## 2021-07-11 DIAGNOSIS — R22.9 LOCALIZED SWELLING, MASS AND LUMP, UNSPECIFIED: Chronic | ICD-10-CM

## 2021-07-11 LAB
APPEARANCE UR: CLEAR — SIGNIFICANT CHANGE UP
BACTERIA # UR AUTO: NEGATIVE — SIGNIFICANT CHANGE UP
BASOPHILS # BLD AUTO: 0.02 K/UL — SIGNIFICANT CHANGE UP (ref 0–0.2)
BASOPHILS NFR BLD AUTO: 0.2 % — SIGNIFICANT CHANGE UP (ref 0–2)
BILIRUB UR-MCNC: NEGATIVE — SIGNIFICANT CHANGE UP
COLOR SPEC: YELLOW — SIGNIFICANT CHANGE UP
DIFF PNL FLD: ABNORMAL
EOSINOPHIL # BLD AUTO: 0.06 K/UL — SIGNIFICANT CHANGE UP (ref 0–0.5)
EOSINOPHIL NFR BLD AUTO: 0.7 % — SIGNIFICANT CHANGE UP (ref 0–6)
EPI CELLS # UR: 2 /HPF — SIGNIFICANT CHANGE UP (ref 0–5)
FIBRINOGEN PPP-MCNC: 574 MG/DL — HIGH (ref 290–520)
GLUCOSE UR QL: ABNORMAL
HCT VFR BLD CALC: 33.4 % — LOW (ref 34.5–45)
HGB BLD-MCNC: 10.6 G/DL — LOW (ref 11.5–15.5)
HYALINE CASTS # UR AUTO: 2 /LPF — SIGNIFICANT CHANGE UP (ref 0–7)
IANC: 5.35 K/UL — SIGNIFICANT CHANGE UP (ref 1.5–8.5)
IMM GRANULOCYTES NFR BLD AUTO: 0.5 % — SIGNIFICANT CHANGE UP (ref 0–1.5)
KETONES UR-MCNC: ABNORMAL
LEUKOCYTE ESTERASE UR-ACNC: NEGATIVE — SIGNIFICANT CHANGE UP
LYMPHOCYTES # BLD AUTO: 2.1 K/UL — SIGNIFICANT CHANGE UP (ref 1–3.3)
LYMPHOCYTES # BLD AUTO: 25.5 % — SIGNIFICANT CHANGE UP (ref 13–44)
MCHC RBC-ENTMCNC: 30.4 PG — SIGNIFICANT CHANGE UP (ref 27–34)
MCHC RBC-ENTMCNC: 31.7 GM/DL — LOW (ref 32–36)
MCV RBC AUTO: 95.7 FL — SIGNIFICANT CHANGE UP (ref 80–100)
MONOCYTES # BLD AUTO: 0.66 K/UL — SIGNIFICANT CHANGE UP (ref 0–0.9)
MONOCYTES NFR BLD AUTO: 8 % — SIGNIFICANT CHANGE UP (ref 2–14)
NEUTROPHILS # BLD AUTO: 5.35 K/UL — SIGNIFICANT CHANGE UP (ref 1.8–7.4)
NEUTROPHILS NFR BLD AUTO: 65.1 % — SIGNIFICANT CHANGE UP (ref 43–77)
NITRITE UR-MCNC: NEGATIVE — SIGNIFICANT CHANGE UP
NRBC # BLD: 0 /100 WBCS — SIGNIFICANT CHANGE UP
NRBC # FLD: 0 K/UL — SIGNIFICANT CHANGE UP
PH UR: 6 — SIGNIFICANT CHANGE UP (ref 5–8)
PLATELET # BLD AUTO: 262 K/UL — SIGNIFICANT CHANGE UP (ref 150–400)
PROT UR-MCNC: ABNORMAL
RBC # BLD: 3.49 M/UL — LOW (ref 3.8–5.2)
RBC # FLD: 12.8 % — SIGNIFICANT CHANGE UP (ref 10.3–14.5)
RBC CASTS # UR COMP ASSIST: 9 /HPF — HIGH (ref 0–4)
SP GR SPEC: 1.04 — HIGH (ref 1.01–1.02)
UROBILINOGEN FLD QL: ABNORMAL
WBC # BLD: 8.23 K/UL — SIGNIFICANT CHANGE UP (ref 3.8–10.5)
WBC # FLD AUTO: 8.23 K/UL — SIGNIFICANT CHANGE UP (ref 3.8–10.5)
WBC UR QL: <5 /HPF — SIGNIFICANT CHANGE UP (ref 0–5)

## 2021-07-11 PROCEDURE — 76818 FETAL BIOPHYS PROFILE W/NST: CPT | Mod: 26

## 2021-07-11 PROCEDURE — L9993: CPT

## 2021-07-11 NOTE — OB PROVIDER TRIAGE NOTE - NSHPLABSRESULTS_GEN_ALL_CORE
10.6   8.23  )-----------( 262      ( 2021 03:26 )             33.4     Fibrinogen: 5    Urinalysis Basic - ( 2021 05:12 )    Color: Yellow / Appearance: Clear / S.043 / pH: x  Gluc: x / Ketone: Trace  / Bili: Negative / Urobili: 3 mg/dL   Blood: x / Protein: 100 mg/dL / Nitrite: Negative   Leuk Esterase: Negative / RBC: 9 /HPF / WBC <5 /HPF   Sq Epi: x / Non Sq Epi: 2 /HPF / Bacteria: Negative

## 2021-07-11 NOTE — OB PROVIDER TRIAGE NOTE - NSHPPHYSICALEXAM_GEN_ALL_CORE
ICU Vital Signs Last 24 Hrs  T(C): 37 (11 Jul 2021 02:14), Max: 37 (11 Jul 2021 02:14)  T(F): 98.6 (11 Jul 2021 02:14), Max: 98.6 (11 Jul 2021 02:14)  HR: 112 (11 Jul 2021 02:14) (110 - 112)  BP: 126/66 (11 Jul 2021 02:14) (126/66 - 127/69)  BP(mean): --  ABP: --  ABP(mean): --  RR: 19 (11 Jul 2021 02:14) (18 - 19)  SpO2: 100% (11 Jul 2021 01:09) (100% - 100%)    NPO since 9:30pm (chicken and rice)   Abdomen soft nontender  SVE: 1/70/-3  TAS: Cephalic presentation, anterior placenta, STEVE: 14.15, EFW: 3314gms, BPP: 8/8  Speculum: No active bleeding noted. Old blood noted

## 2021-07-11 NOTE — OB PROVIDER TRIAGE NOTE - ADDITIONAL INSTRUCTIONS
Pt. d/c'd home. Pt. to follow up with next OB appointment on Monday (7/12/21). Pt. instructed to return to triage with increase abdominal contractions, leakage of fluid, vaginal bleeding or decrease fetal movement.

## 2021-07-11 NOTE — OB PROVIDER TRIAGE NOTE - NSOBPROVIDERNOTE_OBGYN_ALL_OB_FT
Discussed findings with Dr. Quintero. Plan to repeat VE in 2hrs. Discussed findings with Dr. Quintero. Plan to repeat VE in 2hrs.    @5:30am   VE: unchanged (1/70/-3)  Pt. states contractions have spaced out.   FHR: 145bpm, moderate variability, accels, no decels  Zaynab irregularly Discussed findings with Dr. Quintero. Plan to repeat VE in 2hrs.    @5:30am   VE: unchanged (1/70/-3)  Pt. states contractions have spaced out.   FHR: 145bpm, moderate variability, accels, no decels  Zaynab irregularly  Discussed with Dr. Monet.   MD at bedside    Plan for FS    Glucose: 105 Discussed findings with Dr. Quintero. Plan to repeat VE in 2hrs.    @5:30am   VE: unchanged (1/70/-3)  Pt. states contractions have spaced out.   FHR: 145bpm, moderate variability, accels, no decels  Zaynab irregularly  Discussed with Dr. Monet.   MD at bedside    Plan for FS    Glucose: 105    As per Dr. Monet and Dr. Beckwith pt. d/c'd home. Pt. to follow up with next OB appointment on Monday (7/12/21). Pt. instructed to return to triage with increase abdominal contractions, leakage of fluid, vaginal bleeding or decrease fetal movement.

## 2021-07-11 NOTE — OB PROVIDER TRIAGE NOTE - HISTORY OF PRESENT ILLNESS
Pt. is a 29y/o  EGA 36.1wks states around 12am she got up to urinate and noticed two quarter size blood clots in the toilet and streaks of blood on her panties. Pt. states when she got to traige she started to feel irregular abdominal contractions pain 8/10. Pt. denies leakage of fluid. Pt. reports good fetal movement. Pt. reports intercourse on Thursday (21). Pt. is scheduled for repeat  21. Pt. states she does not wish to TOLAC.    AP: GDMA2    Blood Type: O Pos.     Medical Hx: Denies    Surgical Hx:      OBGYN Hx:   Primary  2019 for NRFHT 8#0 GMDA2  SABx1 2016     Meds: Levemir 45units qhs     Allergies: Shellfish-Nausea

## 2021-07-12 ENCOUNTER — NON-APPOINTMENT (OUTPATIENT)
Age: 31
End: 2021-07-12

## 2021-07-12 ENCOUNTER — APPOINTMENT (OUTPATIENT)
Dept: OBGYN | Facility: HOSPITAL | Age: 31
End: 2021-07-12

## 2021-07-12 ENCOUNTER — APPOINTMENT (OUTPATIENT)
Dept: ANTEPARTUM | Facility: CLINIC | Age: 31
End: 2021-07-12
Payer: MEDICAID

## 2021-07-12 ENCOUNTER — RESULT REVIEW (OUTPATIENT)
Age: 31
End: 2021-07-12

## 2021-07-12 ENCOUNTER — OUTPATIENT (OUTPATIENT)
Dept: OUTPATIENT SERVICES | Facility: HOSPITAL | Age: 31
LOS: 1 days | End: 2021-07-12

## 2021-07-12 ENCOUNTER — ASOB RESULT (OUTPATIENT)
Age: 31
End: 2021-07-12

## 2021-07-12 ENCOUNTER — APPOINTMENT (OUTPATIENT)
Dept: ANTEPARTUM | Facility: CLINIC | Age: 31
End: 2021-07-12

## 2021-07-12 VITALS
WEIGHT: 255 LBS | SYSTOLIC BLOOD PRESSURE: 124 MMHG | HEART RATE: 111 BPM | BODY MASS INDEX: 45.17 KG/M2 | TEMPERATURE: 98.2 F | DIASTOLIC BLOOD PRESSURE: 82 MMHG

## 2021-07-12 DIAGNOSIS — R22.9 LOCALIZED SWELLING, MASS AND LUMP, UNSPECIFIED: Chronic | ICD-10-CM

## 2021-07-12 DIAGNOSIS — R58 HEMORRHAGE, NOT ELSEWHERE CLASSIFIED: Chronic | ICD-10-CM

## 2021-07-12 PROCEDURE — 76818 FETAL BIOPHYS PROFILE W/NST: CPT | Mod: 26

## 2021-07-12 PROCEDURE — 76816 OB US FOLLOW-UP PER FETUS: CPT | Mod: 26

## 2021-07-13 ENCOUNTER — NON-APPOINTMENT (OUTPATIENT)
Age: 31
End: 2021-07-13

## 2021-07-13 DIAGNOSIS — Z34.83 ENCOUNTER FOR SUPERVISION OF OTHER NORMAL PREGNANCY, THIRD TRIMESTER: ICD-10-CM

## 2021-07-13 DIAGNOSIS — O24.419 GESTATIONAL DIABETES MELLITUS IN PREGNANCY, UNSPECIFIED CONTROL: ICD-10-CM

## 2021-07-14 ENCOUNTER — NON-APPOINTMENT (OUTPATIENT)
Age: 31
End: 2021-07-14

## 2021-07-15 LAB
CULTURE RESULTS: SIGNIFICANT CHANGE UP
SPECIMEN SOURCE: SIGNIFICANT CHANGE UP

## 2021-07-19 ENCOUNTER — NON-APPOINTMENT (OUTPATIENT)
Age: 31
End: 2021-07-19

## 2021-07-19 ENCOUNTER — OUTPATIENT (OUTPATIENT)
Dept: OUTPATIENT SERVICES | Facility: HOSPITAL | Age: 31
LOS: 1 days | End: 2021-07-19

## 2021-07-19 ENCOUNTER — RESULT REVIEW (OUTPATIENT)
Age: 31
End: 2021-07-19

## 2021-07-19 ENCOUNTER — APPOINTMENT (OUTPATIENT)
Dept: OBGYN | Facility: HOSPITAL | Age: 31
End: 2021-07-19

## 2021-07-19 VITALS
WEIGHT: 255.96 LBS | OXYGEN SATURATION: 98 % | HEART RATE: 100 BPM | DIASTOLIC BLOOD PRESSURE: 80 MMHG | TEMPERATURE: 98 F | SYSTOLIC BLOOD PRESSURE: 110 MMHG | RESPIRATION RATE: 18 BRPM | HEIGHT: 62 IN

## 2021-07-19 VITALS
BODY MASS INDEX: 45.7 KG/M2 | HEART RATE: 100 BPM | WEIGHT: 258 LBS | DIASTOLIC BLOOD PRESSURE: 80 MMHG | RESPIRATION RATE: 20 BRPM | TEMPERATURE: 98.2 F | SYSTOLIC BLOOD PRESSURE: 100 MMHG

## 2021-07-19 DIAGNOSIS — R58 HEMORRHAGE, NOT ELSEWHERE CLASSIFIED: Chronic | ICD-10-CM

## 2021-07-19 DIAGNOSIS — O24.419 GESTATIONAL DIABETES MELLITUS IN PREGNANCY, UNSPECIFIED CONTROL: ICD-10-CM

## 2021-07-19 DIAGNOSIS — Z64.1 PROBLEMS RELATED TO MULTIPARITY: ICD-10-CM

## 2021-07-19 DIAGNOSIS — E66.9 OBESITY, UNSPECIFIED: ICD-10-CM

## 2021-07-19 DIAGNOSIS — Z34.90 ENCOUNTER FOR SUPERVISION OF NORMAL PREGNANCY, UNSPECIFIED, UNSPECIFIED TRIMESTER: Chronic | ICD-10-CM

## 2021-07-19 DIAGNOSIS — Z98.890 OTHER SPECIFIED POSTPROCEDURAL STATES: ICD-10-CM

## 2021-07-19 DIAGNOSIS — Z34.90 ENCOUNTER FOR SUPERVISION OF NORMAL PREGNANCY, UNSPECIFIED, UNSPECIFIED TRIMESTER: ICD-10-CM

## 2021-07-19 DIAGNOSIS — Z34.83 ENCOUNTER FOR SUPERVISION OF OTHER NORMAL PREGNANCY, THIRD TRIMESTER: ICD-10-CM

## 2021-07-19 DIAGNOSIS — R22.9 LOCALIZED SWELLING, MASS AND LUMP, UNSPECIFIED: Chronic | ICD-10-CM

## 2021-07-19 DIAGNOSIS — Z91.013 ALLERGY TO SEAFOOD: ICD-10-CM

## 2021-07-19 LAB
ANION GAP SERPL CALC-SCNC: 14 MMOL/L — SIGNIFICANT CHANGE UP (ref 7–14)
APPEARANCE UR: CLEAR — SIGNIFICANT CHANGE UP
BILIRUB UR-MCNC: NEGATIVE — SIGNIFICANT CHANGE UP
BLD GP AB SCN SERPL QL: NEGATIVE — SIGNIFICANT CHANGE UP
BUN SERPL-MCNC: 5 MG/DL — LOW (ref 7–23)
CALCIUM SERPL-MCNC: 9.2 MG/DL — SIGNIFICANT CHANGE UP (ref 8.4–10.5)
CHLORIDE SERPL-SCNC: 101 MMOL/L — SIGNIFICANT CHANGE UP (ref 98–107)
CO2 SERPL-SCNC: 21 MMOL/L — LOW (ref 22–31)
COLOR SPEC: YELLOW — SIGNIFICANT CHANGE UP
CREAT SERPL-MCNC: 0.57 MG/DL — SIGNIFICANT CHANGE UP (ref 0.5–1.3)
DIFF PNL FLD: NEGATIVE — SIGNIFICANT CHANGE UP
GLUCOSE SERPL-MCNC: 67 MG/DL — LOW (ref 70–99)
GLUCOSE UR QL: ABNORMAL
HCT VFR BLD CALC: 35.7 % — SIGNIFICANT CHANGE UP (ref 34.5–45)
HGB BLD-MCNC: 11.2 G/DL — LOW (ref 11.5–15.5)
KETONES UR-MCNC: ABNORMAL
LEUKOCYTE ESTERASE UR-ACNC: NEGATIVE — SIGNIFICANT CHANGE UP
MCHC RBC-ENTMCNC: 30.4 PG — SIGNIFICANT CHANGE UP (ref 27–34)
MCHC RBC-ENTMCNC: 31.4 GM/DL — LOW (ref 32–36)
MCV RBC AUTO: 96.7 FL — SIGNIFICANT CHANGE UP (ref 80–100)
NITRITE UR-MCNC: NEGATIVE — SIGNIFICANT CHANGE UP
NRBC # BLD: 0 /100 WBCS — SIGNIFICANT CHANGE UP
NRBC # FLD: 0 K/UL — SIGNIFICANT CHANGE UP
PH UR: 6.5 — SIGNIFICANT CHANGE UP (ref 5–8)
PLATELET # BLD AUTO: 282 K/UL — SIGNIFICANT CHANGE UP (ref 150–400)
POTASSIUM SERPL-MCNC: 4.4 MMOL/L — SIGNIFICANT CHANGE UP (ref 3.5–5.3)
POTASSIUM SERPL-SCNC: 4.4 MMOL/L — SIGNIFICANT CHANGE UP (ref 3.5–5.3)
PROT UR-MCNC: ABNORMAL
RBC # BLD: 3.69 M/UL — LOW (ref 3.8–5.2)
RBC # FLD: 13.1 % — SIGNIFICANT CHANGE UP (ref 10.3–14.5)
RH IG SCN BLD-IMP: POSITIVE — SIGNIFICANT CHANGE UP
SODIUM SERPL-SCNC: 136 MMOL/L — SIGNIFICANT CHANGE UP (ref 135–145)
SP GR SPEC: 1.03 — HIGH (ref 1.01–1.02)
UROBILINOGEN FLD QL: SIGNIFICANT CHANGE UP
WBC # BLD: 9.72 K/UL — SIGNIFICANT CHANGE UP (ref 3.8–10.5)
WBC # FLD AUTO: 9.72 K/UL — SIGNIFICANT CHANGE UP (ref 3.8–10.5)

## 2021-07-19 RX ORDER — INSULIN LISPRO 100 U/ML
100 INJECTION, SOLUTION SUBCUTANEOUS
Qty: 1 | Refills: 0 | Status: ACTIVE | COMMUNITY
Start: 2021-07-19 | End: 1900-01-01

## 2021-07-19 NOTE — OB PST NOTE - NSHPREVIEWOFSYSTEMS_GEN_ALL_CORE
General: No fever, chills, sweating    Skin: No rashes, itching, or dryness    Breast: c/o nipple tenderness       Ophthalmologic: No diplopia, photophobia    ENMT Symptoms: No hearing difficulty, nasal congestion    Respiratory and Thorax: No wheezing, dyspnea, cough    Cardiovascular: No chest pain, palpitations, dyspnea on exertion    Gastrointestinal: No nausea, vomiting, diarrhea, constipation, abdominal pain    Genitourinary/ Pelvis: had "blood in the urine" and clinic sent urine specimen today 7-19-21 (await results); No renal colic, or flank pain    Musculoskeletal: No arthralgia    Neurological: No seizures, no cva, no migraines    Psychiatric: No suicidal ideation, depression, anxiety    Hematology: No gum bleeding, nose bleeding    Lymphatic: slight ankle edema; No enlarged or tender lymph nodes.     Endocrine: gestational DM and taking insulin; No heat or cold intolerance    Immunologic: No recurrent or persistent infections

## 2021-07-19 NOTE — OB PST NOTE - NSHPPHYSICALEXAM_GEN_ALL_CORE
Constitutional: Well Developed, Well Groomed, Well Nourished, No Distress    Eyes: EOMI    Ears: Normal    Mouth & Gums: Normal, moist    Pharynx: No tenderness, discharge    Tonsils: No Redness, discharge, tenderness, or swelling    Neck: Supple, normal thyroid gland    Breast: c/o nipple tenderness    Back: ROM intact    Respiratory: CTA, no rales, no rhonchi, no wheezing    Cardiovascular:  Regular rate and rhythm, no rubs or murmur    Gastrointestinal: Soft, non-tender, non distention, no masses palpable, bowel sound normal    Extremities: slight pedal edema    Vascular:  Radial Pulse normal    Neurological: alert & oriented x 3    Skin: warm and dry    Lymph Nodes: no palpable adenopathy lymph nodes    Musculoskeletal: ROM intact    Psychiatric: normal affect, normal behavior Constitutional: Well Developed, Well Groomed, Well Nourished, No Distress    Eyes: EOMI    Ears: Normal    Mouth & Gums: Normal, moist    Pharynx: No tenderness, discharge    Tonsils: No Redness, discharge, tenderness, or swelling    Neck: Supple, normal thyroid gland    Breast: c/o nipple tenderness    Back: ROM intact    Respiratory: CTA, no rales, no rhonchi, no wheezing    Cardiovascular:  heart murmur auscultated 2nd RICS; Regular rate and rhythm, no rubs     Gastrointestinal: Soft, non-tender, non distention, no masses palpable, bowel sound normal    Extremities: slight pedal edema    Vascular:  Radial Pulse normal    Neurological: alert & oriented x 3    Skin: warm and dry    Lymph Nodes: no palpable adenopathy lymph nodes    Musculoskeletal: ROM intact    Psychiatric: normal affect, normal behavior

## 2021-07-19 NOTE — OB PST NOTE - HISTORY OF PRESENT ILLNESS
This is a 31 y/o female who is scheduled for repeat , b/l tubal ligation on 21 with LEIGHTON 21. Last fetal movement at present; denies vaginal bleeding

## 2021-07-19 NOTE — OB PST NOTE - PROBLEM SELECTOR PLAN 2
Instructed to speak with ob-gyn regarding preop insulin  * Last dose of prenatal vitamins on 7-19-21  * Lab work included: t&s, bmp, cbc, ua

## 2021-07-19 NOTE — OB PST NOTE - PMH
Gestational diabetes  on insulin for pregnancy in May 2019 and in July 2021  Miscarriage  2016  Pregnancy  this pregnancy with LEIGHTON 5-12-19

## 2021-07-19 NOTE — OB PST NOTE - PSH
Bleeding  D&C "long time ago"   delivery delivered  May 2019, full term baby boy  (done due to not dilating), weight 8 pounds 6 ounces  Mass  excision of cyst from left foot  Pregnancy  scheduled for  and b/l tubal ligation on 21

## 2021-07-19 NOTE — OB PST NOTE - PROBLEM SELECTOR PLAN 1
This is a 29 y/o female who is scheduled for repeat , b/l tubal ligation on 21  * Instructed to speak with surgeon regarding covid testing--has NOT had covid vaccination   * Given preop and cleanser instructions with good teach back and patient verbalized understanding

## 2021-07-20 ENCOUNTER — APPOINTMENT (OUTPATIENT)
Dept: ANTEPARTUM | Facility: CLINIC | Age: 31
End: 2021-07-20
Payer: MEDICAID

## 2021-07-20 ENCOUNTER — APPOINTMENT (OUTPATIENT)
Dept: ANTEPARTUM | Facility: CLINIC | Age: 31
End: 2021-07-20

## 2021-07-20 ENCOUNTER — ASOB RESULT (OUTPATIENT)
Age: 31
End: 2021-07-20

## 2021-07-20 ENCOUNTER — NON-APPOINTMENT (OUTPATIENT)
Age: 31
End: 2021-07-20

## 2021-07-20 ENCOUNTER — OUTPATIENT (OUTPATIENT)
Dept: OUTPATIENT SERVICES | Facility: HOSPITAL | Age: 31
LOS: 1 days | End: 2021-07-20

## 2021-07-20 DIAGNOSIS — R58 HEMORRHAGE, NOT ELSEWHERE CLASSIFIED: Chronic | ICD-10-CM

## 2021-07-20 DIAGNOSIS — O24.419 GESTATIONAL DIABETES MELLITUS IN PREGNANCY, UNSPECIFIED CONTROL: ICD-10-CM

## 2021-07-20 DIAGNOSIS — Z34.90 ENCOUNTER FOR SUPERVISION OF NORMAL PREGNANCY, UNSPECIFIED, UNSPECIFIED TRIMESTER: Chronic | ICD-10-CM

## 2021-07-20 DIAGNOSIS — Z34.83 ENCOUNTER FOR SUPERVISION OF OTHER NORMAL PREGNANCY, THIRD TRIMESTER: ICD-10-CM

## 2021-07-20 DIAGNOSIS — E66.9 OBESITY, UNSPECIFIED: ICD-10-CM

## 2021-07-20 DIAGNOSIS — R22.9 LOCALIZED SWELLING, MASS AND LUMP, UNSPECIFIED: Chronic | ICD-10-CM

## 2021-07-20 LAB
CULTURE RESULTS: SIGNIFICANT CHANGE UP
SPECIMEN SOURCE: SIGNIFICANT CHANGE UP

## 2021-07-20 PROCEDURE — 76818 FETAL BIOPHYS PROFILE W/NST: CPT | Mod: 26

## 2021-07-21 ENCOUNTER — NON-APPOINTMENT (OUTPATIENT)
Age: 31
End: 2021-07-21

## 2021-07-24 ENCOUNTER — NON-APPOINTMENT (OUTPATIENT)
Age: 31
End: 2021-07-24

## 2021-07-25 ENCOUNTER — TRANSCRIPTION ENCOUNTER (OUTPATIENT)
Age: 31
End: 2021-07-25

## 2021-07-26 ENCOUNTER — APPOINTMENT (OUTPATIENT)
Dept: OBGYN | Facility: HOSPITAL | Age: 31
End: 2021-07-26

## 2021-07-26 ENCOUNTER — APPOINTMENT (OUTPATIENT)
Dept: ANTEPARTUM | Facility: CLINIC | Age: 31
End: 2021-07-26
Payer: MEDICAID

## 2021-07-26 ENCOUNTER — INPATIENT (INPATIENT)
Facility: HOSPITAL | Age: 31
LOS: 1 days | Discharge: HOME HEALTH | End: 2021-07-28
Attending: SPECIALIST | Admitting: SPECIALIST
Payer: MEDICAID

## 2021-07-26 ENCOUNTER — NON-APPOINTMENT (OUTPATIENT)
Age: 31
End: 2021-07-26

## 2021-07-26 ENCOUNTER — RESULT REVIEW (OUTPATIENT)
Age: 31
End: 2021-07-26

## 2021-07-26 ENCOUNTER — ASOB RESULT (OUTPATIENT)
Age: 31
End: 2021-07-26

## 2021-07-26 ENCOUNTER — OUTPATIENT (OUTPATIENT)
Dept: OUTPATIENT SERVICES | Facility: HOSPITAL | Age: 31
LOS: 1 days | End: 2021-07-26

## 2021-07-26 VITALS — DIASTOLIC BLOOD PRESSURE: 79 MMHG | HEART RATE: 102 BPM | SYSTOLIC BLOOD PRESSURE: 146 MMHG

## 2021-07-26 VITALS
BODY MASS INDEX: 45.54 KG/M2 | HEART RATE: 106 BPM | SYSTOLIC BLOOD PRESSURE: 124 MMHG | TEMPERATURE: 98 F | HEIGHT: 63 IN | DIASTOLIC BLOOD PRESSURE: 84 MMHG | WEIGHT: 257 LBS

## 2021-07-26 DIAGNOSIS — O24.414 GESTATIONAL DIABETES MELLITUS IN PREGNANCY, INSULIN CONTROLLED: ICD-10-CM

## 2021-07-26 DIAGNOSIS — Z34.90 ENCOUNTER FOR SUPERVISION OF NORMAL PREGNANCY, UNSPECIFIED, UNSPECIFIED TRIMESTER: Chronic | ICD-10-CM

## 2021-07-26 DIAGNOSIS — R58 HEMORRHAGE, NOT ELSEWHERE CLASSIFIED: Chronic | ICD-10-CM

## 2021-07-26 DIAGNOSIS — Z98.891 HISTORY OF UTERINE SCAR FROM PREVIOUS SURGERY: ICD-10-CM

## 2021-07-26 DIAGNOSIS — O34.211 MATERNAL CARE FOR LOW TRANSVERSE SCAR FROM PREVIOUS CESAREAN DELIVERY: ICD-10-CM

## 2021-07-26 DIAGNOSIS — R22.9 LOCALIZED SWELLING, MASS AND LUMP, UNSPECIFIED: Chronic | ICD-10-CM

## 2021-07-26 PROBLEM — O24.419 GESTATIONAL DIABETES MELLITUS IN PREGNANCY, UNSPECIFIED CONTROL: Chronic | Status: ACTIVE | Noted: 2019-05-01

## 2021-07-26 LAB
BASOPHILS # BLD AUTO: 0.01 K/UL — SIGNIFICANT CHANGE UP (ref 0–0.2)
BASOPHILS NFR BLD AUTO: 0.1 % — SIGNIFICANT CHANGE UP (ref 0–2)
COVID-19 SPIKE DOMAIN AB INTERP: POSITIVE
COVID-19 SPIKE DOMAIN ANTIBODY RESULT: 91 U/ML — HIGH
EOSINOPHIL # BLD AUTO: 0.02 K/UL — SIGNIFICANT CHANGE UP (ref 0–0.5)
EOSINOPHIL NFR BLD AUTO: 0.2 % — SIGNIFICANT CHANGE UP (ref 0–6)
HCT VFR BLD CALC: 34.4 % — LOW (ref 34.5–45)
HGB BLD-MCNC: 11.2 G/DL — LOW (ref 11.5–15.5)
IANC: 5.51 K/UL — SIGNIFICANT CHANGE UP (ref 1.5–8.5)
IMM GRANULOCYTES NFR BLD AUTO: 0.4 % — SIGNIFICANT CHANGE UP (ref 0–1.5)
LYMPHOCYTES # BLD AUTO: 2.33 K/UL — SIGNIFICANT CHANGE UP (ref 1–3.3)
LYMPHOCYTES # BLD AUTO: 27.5 % — SIGNIFICANT CHANGE UP (ref 13–44)
MCHC RBC-ENTMCNC: 30.8 PG — SIGNIFICANT CHANGE UP (ref 27–34)
MCHC RBC-ENTMCNC: 32.6 GM/DL — SIGNIFICANT CHANGE UP (ref 32–36)
MCV RBC AUTO: 94.5 FL — SIGNIFICANT CHANGE UP (ref 80–100)
MONOCYTES # BLD AUTO: 0.58 K/UL — SIGNIFICANT CHANGE UP (ref 0–0.9)
MONOCYTES NFR BLD AUTO: 6.8 % — SIGNIFICANT CHANGE UP (ref 2–14)
NEUTROPHILS # BLD AUTO: 5.51 K/UL — SIGNIFICANT CHANGE UP (ref 1.8–7.4)
NEUTROPHILS NFR BLD AUTO: 65 % — SIGNIFICANT CHANGE UP (ref 43–77)
NRBC # BLD: 0 /100 WBCS — SIGNIFICANT CHANGE UP
NRBC # FLD: 0 K/UL — SIGNIFICANT CHANGE UP
PLATELET # BLD AUTO: 259 K/UL — SIGNIFICANT CHANGE UP (ref 150–400)
RBC # BLD: 3.64 M/UL — LOW (ref 3.8–5.2)
RBC # FLD: 13.1 % — SIGNIFICANT CHANGE UP (ref 10.3–14.5)
RH IG SCN BLD-IMP: POSITIVE — SIGNIFICANT CHANGE UP
SARS-COV-2 IGG+IGM SERPL QL IA: 91 U/ML — HIGH
SARS-COV-2 IGG+IGM SERPL QL IA: POSITIVE
SARS-COV-2 RNA SPEC QL NAA+PROBE: SIGNIFICANT CHANGE UP
WBC # BLD: 8.48 K/UL — SIGNIFICANT CHANGE UP (ref 3.8–10.5)
WBC # FLD AUTO: 8.48 K/UL — SIGNIFICANT CHANGE UP (ref 3.8–10.5)

## 2021-07-26 PROCEDURE — 76818 FETAL BIOPHYS PROFILE W/NST: CPT | Mod: 26

## 2021-07-26 PROCEDURE — 59514 CESAREAN DELIVERY ONLY: CPT | Mod: U9,UB,GC

## 2021-07-26 PROCEDURE — 88307 TISSUE EXAM BY PATHOLOGIST: CPT | Mod: 26

## 2021-07-26 RX ORDER — OXYTOCIN 10 UNIT/ML
333.33 VIAL (ML) INJECTION
Qty: 20 | Refills: 0 | Status: DISCONTINUED | OUTPATIENT
Start: 2021-07-27 | End: 2021-07-28

## 2021-07-26 RX ORDER — LANOLIN
1 OINTMENT (GRAM) TOPICAL EVERY 6 HOURS
Refills: 0 | Status: DISCONTINUED | OUTPATIENT
Start: 2021-07-27 | End: 2021-07-28

## 2021-07-26 RX ORDER — SODIUM CHLORIDE 9 MG/ML
1000 INJECTION, SOLUTION INTRAVENOUS
Refills: 0 | Status: DISCONTINUED | OUTPATIENT
Start: 2021-07-26 | End: 2021-07-26

## 2021-07-26 RX ORDER — CITRIC ACID/SODIUM CITRATE 300-500 MG
30 SOLUTION, ORAL ORAL ONCE
Refills: 0 | Status: COMPLETED | OUTPATIENT
Start: 2021-07-26 | End: 2021-07-26

## 2021-07-26 RX ORDER — AMPICILLIN TRIHYDRATE 250 MG
2 CAPSULE ORAL ONCE
Refills: 0 | Status: COMPLETED | OUTPATIENT
Start: 2021-07-26 | End: 2021-07-26

## 2021-07-26 RX ORDER — SODIUM CHLORIDE 9 MG/ML
1000 INJECTION, SOLUTION INTRAVENOUS ONCE
Refills: 0 | Status: COMPLETED | OUTPATIENT
Start: 2021-07-26 | End: 2021-07-26

## 2021-07-26 RX ORDER — SODIUM CHLORIDE 9 MG/ML
1000 INJECTION, SOLUTION INTRAVENOUS
Refills: 0 | Status: DISCONTINUED | OUTPATIENT
Start: 2021-07-26 | End: 2021-07-27

## 2021-07-26 RX ORDER — DIPHENHYDRAMINE HCL 50 MG
25 CAPSULE ORAL EVERY 6 HOURS
Refills: 0 | Status: DISCONTINUED | OUTPATIENT
Start: 2021-07-27 | End: 2021-07-28

## 2021-07-26 RX ORDER — SODIUM CHLORIDE 9 MG/ML
1000 INJECTION, SOLUTION INTRAVENOUS ONCE
Refills: 0 | Status: DISCONTINUED | OUTPATIENT
Start: 2021-07-26 | End: 2021-07-26

## 2021-07-26 RX ORDER — CITRIC ACID/SODIUM CITRATE 300-500 MG
30 SOLUTION, ORAL ORAL ONCE
Refills: 0 | Status: DISCONTINUED | OUTPATIENT
Start: 2021-07-26 | End: 2021-07-27

## 2021-07-26 RX ORDER — SODIUM CHLORIDE 9 MG/ML
1000 INJECTION, SOLUTION INTRAVENOUS ONCE
Refills: 0 | Status: DISCONTINUED | OUTPATIENT
Start: 2021-07-26 | End: 2021-07-27

## 2021-07-26 RX ORDER — SODIUM CHLORIDE 9 MG/ML
1000 INJECTION, SOLUTION INTRAVENOUS
Refills: 0 | Status: DISCONTINUED | OUTPATIENT
Start: 2021-07-27 | End: 2021-07-28

## 2021-07-26 RX ORDER — SIMETHICONE 80 MG/1
80 TABLET, CHEWABLE ORAL EVERY 4 HOURS
Refills: 0 | Status: DISCONTINUED | OUTPATIENT
Start: 2021-07-27 | End: 2021-07-28

## 2021-07-26 RX ORDER — KETOROLAC TROMETHAMINE 30 MG/ML
30 SYRINGE (ML) INJECTION EVERY 6 HOURS
Refills: 0 | Status: DISCONTINUED | OUTPATIENT
Start: 2021-07-27 | End: 2021-07-28

## 2021-07-26 RX ORDER — OXYCODONE HYDROCHLORIDE 5 MG/1
5 TABLET ORAL ONCE
Refills: 0 | Status: DISCONTINUED | OUTPATIENT
Start: 2021-07-27 | End: 2021-07-28

## 2021-07-26 RX ORDER — ACETAMINOPHEN 500 MG
975 TABLET ORAL
Refills: 0 | Status: DISCONTINUED | OUTPATIENT
Start: 2021-07-27 | End: 2021-07-28

## 2021-07-26 RX ORDER — FAMOTIDINE 10 MG/ML
20 INJECTION INTRAVENOUS ONCE
Refills: 0 | Status: DISCONTINUED | OUTPATIENT
Start: 2021-07-26 | End: 2021-07-26

## 2021-07-26 RX ORDER — HUMAN INSULIN 100 [IU]/ML
0 INJECTION, SUSPENSION SUBCUTANEOUS
Qty: 0 | Refills: 0 | DISCHARGE

## 2021-07-26 RX ORDER — HEPARIN SODIUM 5000 [USP'U]/ML
10000 INJECTION INTRAVENOUS; SUBCUTANEOUS EVERY 12 HOURS
Refills: 0 | Status: DISCONTINUED | OUTPATIENT
Start: 2021-07-27 | End: 2021-07-28

## 2021-07-26 RX ORDER — FAMOTIDINE 10 MG/ML
20 INJECTION INTRAVENOUS ONCE
Refills: 0 | Status: COMPLETED | OUTPATIENT
Start: 2021-07-26 | End: 2021-07-26

## 2021-07-26 RX ORDER — IBUPROFEN 200 MG
600 TABLET ORAL EVERY 6 HOURS
Refills: 0 | Status: COMPLETED | OUTPATIENT
Start: 2021-07-27 | End: 2022-06-25

## 2021-07-26 RX ORDER — CITRIC ACID/SODIUM CITRATE 300-500 MG
30 SOLUTION, ORAL ORAL ONCE
Refills: 0 | Status: DISCONTINUED | OUTPATIENT
Start: 2021-07-26 | End: 2021-07-26

## 2021-07-26 RX ORDER — TETANUS TOXOID, REDUCED DIPHTHERIA TOXOID AND ACELLULAR PERTUSSIS VACCINE, ADSORBED 5; 2.5; 8; 8; 2.5 [IU]/.5ML; [IU]/.5ML; UG/.5ML; UG/.5ML; UG/.5ML
0.5 SUSPENSION INTRAMUSCULAR ONCE
Refills: 0 | Status: DISCONTINUED | OUTPATIENT
Start: 2021-07-27 | End: 2021-07-28

## 2021-07-26 RX ORDER — AMPICILLIN TRIHYDRATE 250 MG
1 CAPSULE ORAL EVERY 4 HOURS
Refills: 0 | Status: DISCONTINUED | OUTPATIENT
Start: 2021-07-26 | End: 2021-07-27

## 2021-07-26 RX ORDER — OXYCODONE HYDROCHLORIDE 5 MG/1
5 TABLET ORAL
Refills: 0 | Status: DISCONTINUED | OUTPATIENT
Start: 2021-07-27 | End: 2021-07-28

## 2021-07-26 RX ORDER — MAGNESIUM HYDROXIDE 400 MG/1
30 TABLET, CHEWABLE ORAL
Refills: 0 | Status: DISCONTINUED | OUTPATIENT
Start: 2021-07-27 | End: 2021-07-28

## 2021-07-26 RX ADMIN — FAMOTIDINE 20 MILLIGRAM(S): 10 INJECTION INTRAVENOUS at 19:58

## 2021-07-26 RX ADMIN — Medication 216 GRAM(S): at 16:33

## 2021-07-26 RX ADMIN — Medication 30 MILLILITER(S): at 19:58

## 2021-07-26 RX ADMIN — Medication 108 GRAM(S): at 19:57

## 2021-07-26 RX ADMIN — SODIUM CHLORIDE 2000 MILLILITER(S): 9 INJECTION, SOLUTION INTRAVENOUS at 16:33

## 2021-07-26 NOTE — OB RN DELIVERY SUMMARY - NSSELHIDDEN_OBGYN_ALL_OB_FT
[NS_DeliveryAttending1_OBGYN_ALL_OB_FT:[id number]=],[NS_DeliveryAssist1_OBGYN_ALL_OB_FT:[id number]=],[NS_DeliveryRN_OBGYN_ALL_OB_FT:[id number]=]

## 2021-07-26 NOTE — OB RN INTRAOPERATIVE NOTE - NSSELHIDDEN_OBGYN_ALL_OB_FT
[NS_[email or web address]_ALL_OB_FT:[id number]],[NS_DeliveryAssist1_OBGYN_ALL_OB_FT:[email or web address]=],[NS_DeliveryRN_OBGYN_ALL_OB_FT:[id number]=]

## 2021-07-26 NOTE — OB PROVIDER H&P - ASSESSMENT
29 yo , EGA@38.3 weeks, was sent from OB clinic for direct admission for repeat  section due to labor.  Patient reports contractions every 10 minutes, denies vaginal bleeding, leakage of fluid, and reports fetal movement.  Denies fever, chills, headaches, changes in vision, chest pain, palpitations, shortness of breath, cough, nausea, vomiting, diarrhea, constipation, urinary symptoms, edema.    Prenatal care at [facility] OB clinic.  Prenatal course is significant for  GDM, A2.     GBS status is positive.  No adverse reactions to anesthesia, no objections to blood transfusions if clinically indicated.  OB hx: 2019,  section, failed labor induction, GDM, A2, 8lbs  Med hx: morbid obesity  Surg hx: 2019,  section, GDM, A2  GYN hx: denies hx of abnormal Papsmear/cysts/fibroids/STDs  Meds: Prenatal vitamins; Humulin 55 Units qhs  Allergies: No Known Drug Allergies  shellfish (Vomiting; Nausea)    Social hx: Denies alcohol, tobacco, drug use    PHYSICAL EXAM  Vital Signs Last 24 Hrs  T(C): 36.1C  HR: 106 (2021 15:58)   BP: 146/79 (2021 15:36)   RR: 17  SpO2: 96% (2021 15:58) (96% - 96%)    Gen: NAD  Head: NC/AT  Cardio: S1S2+, RRR  Resp: CTABL, no wheezing  Abdomen: Soft, NT/ND, BS+  Extremities: bilateral lower extremities edema bilaterally    NST is in progress    A:  29 yo , EGA@38.2 weeks, GDM, A2, GDM, A2, previous  section in labor; desires BTL    Plan: admit for repeat  section with BTL; follow up GDM protocol; case was reviewed with Dr Quintero.    Evelin Hernandez, EPHRAIM     21 @ 16:04

## 2021-07-26 NOTE — OB RN DELIVERY SUMMARY - NS_SEPSISRSKCALC_OBGYN_ALL_OB_FT
EOS calculated successfully. EOS Risk Factor: 0.5/1000 live births ([facility] national incidence); GA=38w2d; Temp=98.24; ROM=0.083; GBS='Positive'; Antibiotics='Broad spectrum antibiotics > 4 hrs prior to birth'

## 2021-07-26 NOTE — OB PROVIDER DELIVERY SUMMARY - NSPROVIDERDELIVERYNOTE_OBGYN_ALL_OB_FT
Repeat low transverse  section   Dense adhesions from anterior abdominal wall to uterus taken down for visualization  Viable male infant, vtx presentation, APGARS 8/9, weight 3920g  Uterus grossly normal  Uterus unable to be exteriorized due to adhesions and b/l salpingectomy not performed  Hysterotomy closed with vicryl in running locked fashion  Heather over hysterotomy site  Excellent hemostasis noted, count correct    QBL: 289  IVF: 2000  UOP: 50 Repeat low transverse  section   Dense adhesions from anterior abdominal wall to uterus taken down for visualization  Viable male infant, vtx presentation, APGARS 8/9, weight 3920g  Uterus grossly normal  Uterus unable to be exteriorized due to adhesions of adnexa to pelvic side wall and therefore b/l salpingectomy not performed  Hysterotomy closed with Caprosyn in running locked fashion  Heather over hysterotomy site  Excellent hemostasis noted, count correct    QBL: 289  IVF:   UOP: 50    Agree with above    PB Smith MD

## 2021-07-26 NOTE — OB PROVIDER DELIVERY SUMMARY - NSSELHIDDEN_OBGYN_ALL_OB_FT
[NS_DeliveryAttending1_OBGYN_ALL_OB_FT:[id number]=],[NS_DeliveryAssist1_OBGYN_ALL_OB_FT:[email or web address]=],[NS_DeliveryRN_OBGYN_ALL_OB_FT:[id number]=]

## 2021-07-26 NOTE — OB PROVIDER H&P - NSPPHRISKEVAL_OBGYN_ALL_OB
BMI >40/Over distended uterus (polyhydramnios, macrosomia, multiple gestation)/Prior , uterine surgery, or multiple laparotomies

## 2021-07-26 NOTE — OB PROVIDER H&P - HISTORY OF PRESENT ILLNESS
Patient was sent from [facility] OB clinic for repeat  section due to labor; patient had cervical change from 1cm to 3cm. Patient reports contractions every 10 minutes.
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
## 2021-07-27 ENCOUNTER — TRANSCRIPTION ENCOUNTER (OUTPATIENT)
Age: 31
End: 2021-07-27

## 2021-07-27 ENCOUNTER — NON-APPOINTMENT (OUTPATIENT)
Age: 31
End: 2021-07-27

## 2021-07-27 DIAGNOSIS — O24.419 GESTATIONAL DIABETES MELLITUS IN PREGNANCY, UNSPECIFIED CONTROL: ICD-10-CM

## 2021-07-27 DIAGNOSIS — O09.93 SUPERVISION OF HIGH RISK PREGNANCY, UNSPECIFIED, THIRD TRIMESTER: ICD-10-CM

## 2021-07-27 LAB
BASOPHILS # BLD AUTO: 0.01 K/UL — SIGNIFICANT CHANGE UP (ref 0–0.2)
BASOPHILS NFR BLD AUTO: 0.1 % — SIGNIFICANT CHANGE UP (ref 0–2)
EOSINOPHIL # BLD AUTO: 0 K/UL — SIGNIFICANT CHANGE UP (ref 0–0.5)
EOSINOPHIL NFR BLD AUTO: 0 % — SIGNIFICANT CHANGE UP (ref 0–6)
HCT VFR BLD CALC: 32.8 % — LOW (ref 34.5–45)
HGB BLD-MCNC: 10.4 G/DL — LOW (ref 11.5–15.5)
IANC: 12.77 K/UL — HIGH (ref 1.5–8.5)
IMM GRANULOCYTES NFR BLD AUTO: 0.6 % — SIGNIFICANT CHANGE UP (ref 0–1.5)
LYMPHOCYTES # BLD AUTO: 0.81 K/UL — LOW (ref 1–3.3)
LYMPHOCYTES # BLD AUTO: 5.8 % — LOW (ref 13–44)
MCHC RBC-ENTMCNC: 30 PG — SIGNIFICANT CHANGE UP (ref 27–34)
MCHC RBC-ENTMCNC: 31.7 GM/DL — LOW (ref 32–36)
MCV RBC AUTO: 94.5 FL — SIGNIFICANT CHANGE UP (ref 80–100)
MONOCYTES # BLD AUTO: 0.38 K/UL — SIGNIFICANT CHANGE UP (ref 0–0.9)
MONOCYTES NFR BLD AUTO: 2.7 % — SIGNIFICANT CHANGE UP (ref 2–14)
NEUTROPHILS # BLD AUTO: 12.77 K/UL — HIGH (ref 1.8–7.4)
NEUTROPHILS NFR BLD AUTO: 90.8 % — HIGH (ref 43–77)
NRBC # BLD: 0 /100 WBCS — SIGNIFICANT CHANGE UP
NRBC # FLD: 0 K/UL — SIGNIFICANT CHANGE UP
PLATELET # BLD AUTO: 260 K/UL — SIGNIFICANT CHANGE UP (ref 150–400)
RBC # BLD: 3.47 M/UL — LOW (ref 3.8–5.2)
RBC # FLD: 13 % — SIGNIFICANT CHANGE UP (ref 10.3–14.5)
T PALLIDUM AB TITR SER: NEGATIVE — SIGNIFICANT CHANGE UP
WBC # BLD: 14.05 K/UL — HIGH (ref 3.8–10.5)
WBC # FLD AUTO: 14.05 K/UL — HIGH (ref 3.8–10.5)

## 2021-07-27 RX ADMIN — Medication 30 MILLIGRAM(S): at 21:50

## 2021-07-27 RX ADMIN — Medication 30 MILLIGRAM(S): at 06:23

## 2021-07-27 RX ADMIN — Medication 30 MILLIGRAM(S): at 07:01

## 2021-07-27 RX ADMIN — Medication 30 MILLIGRAM(S): at 13:40

## 2021-07-27 RX ADMIN — HEPARIN SODIUM 10000 UNIT(S): 5000 INJECTION INTRAVENOUS; SUBCUTANEOUS at 17:35

## 2021-07-27 RX ADMIN — Medication 1000 MILLIUNIT(S)/MIN: at 05:02

## 2021-07-27 RX ADMIN — SODIUM CHLORIDE 75 MILLILITER(S): 9 INJECTION, SOLUTION INTRAVENOUS at 05:02

## 2021-07-27 RX ADMIN — Medication 30 MILLIGRAM(S): at 21:00

## 2021-07-27 RX ADMIN — HEPARIN SODIUM 10000 UNIT(S): 5000 INJECTION INTRAVENOUS; SUBCUTANEOUS at 05:15

## 2021-07-27 RX ADMIN — Medication 30 MILLIGRAM(S): at 14:40

## 2021-07-27 NOTE — OB NEONATOLOGY/PEDIATRICIAN DELIVERY SUMMARY - NSPEDSNEONOTESA_OBGYN_ALL_OB_FT
Baby is a 38.2wk [place] male born to a 29 y/o  mother via repeat C/S, not scheduled because mom went into labor. PEDS called to delivery for C/S and cat II. Maternal history GDMA2, PCOS, Covid in december. Mom on humalin.  Prenatal history uncomplicated. Maternal blood type O+. PNL negative, non-reactive, and immune. GBS positive. No rupture, no labor, clear fluids at uterine incision. . Baby born vigorous and crying spontaneously. Warmed, dried, stimulated. Apgars 8/9. EOS N/A. Mom plans to breastfeed, declines Hep b. Circ declined. Baby is a 38.2wk [place] male born to a 29 y/o  mother via repeat vac assist C/S, not scheduled because mom went into labor. PEDS called to delivery for C/S and cat II. Maternal history GDMA2, PCOS, Covid in december. Mom on humalin.  Prenatal history uncomplicated. Maternal blood type O+. PNL negative, non-reactive, and immune. GBS positive. Got ampicillin. Mat tmax 36.8. No rupture, no labor, clear fluids at uterine incision. Vacuum used, pop off x 2.  Baby born vigorous and crying spontaneously. Warmed, dried, stimulated. Apgars 8/9. EOS N/A. Mom plans to breastfeed, declines Hep b. Circ declined.

## 2021-07-27 NOTE — DISCHARGE NOTE OB - CARE PROVIDER_API CALL
Ghazala Albert)  Obstetrics and Gynecology  [phone] [place] [address], [place] [address]  Phone: [phone]  Fax: [phone]  Follow Up Time:

## 2021-07-27 NOTE — LACTATION INITIAL EVALUATION - LATCH: LATCH INFANT
After Visit Summary   7/10/2018    Patrick Olson    MRN: [id number]           Patient Information     Date Of Birth          1943        Visit Information        Provider Department      7/10/2018 12:10 PM Mukesh Lantigua, DO [facility]        Today's Diagnoses     Lumbar back pain with radiculopathy affecting left lower extremity    -  1    Sacroiliac joint pain           Follow-ups after your visit        Your next 10 appointments already scheduled     Jul 12, 2018  8:30 AM CDT   MR LUMBAR SPINE W/O CONTRAST with [id number]   [facility] ([facility])    [address] [address] [phone]           Take your medicines as usual, unless your doctor tells you not to. Bring a list of your current medicines to your exam (including vitamins, minerals and over-the-counter drugs). Also bring the results of similar scans you may have had.  Please remove any body piercings and hair extensions before you arrive.  Follow your doctor s orders. If you do not, we may have to postpone your exam.  You may or may not receive IV contrast for this exam pending the discretion of the Radiologist.  You do not need to do anything special to prepare.  The MRI machine uses a strong magnet. Please wear clothes without metal (snaps, zippers). A sweatsuit works well, or we may give you a hospital gown.   **IMPORTANT** THE INSTRUCTIONS BELOW ARE ONLY FOR THOSE PATIENTS WHO HAVE BEEN PRESCRIBED SEDATION OR GENERAL ANESTHESIA DURING THEIR MRI PROCEDURE:  IF YOUR DOCTOR PRESCRIBED ORAL SEDATION (take medicine to help you relax during your exam):   You must get the medicine from your doctor (oral medication) before you arrive. Bring the medicine to the exam. Do not take it at home. You ll be told when to take it upon arriving for your exam.   Arrive one hour early. Bring someone who can take you home after the test.  Your medicine will make you sleepy. After the exam, you may not drive, take a bus or take a taxi by yourself.  IF YOUR DOCTOR PRESCRIBED IV SEDATION:   Arrive one hour early. Bring someone who can take you home after the test. Your medicine will make you sleepy. After the exam, you may not drive, take a bus or take a taxi by yourself.   No eating 6 hours before your exam. You may have clear liquids up until 4 hours before your exam. (Clear liquids include water, clear tea, black coffee and fruit juice without pulp.)  IF YOUR DOCTOR PRESCRIBED ANESTHESIA (be asleep for your exam):   Arrive 1 1/2 hours early. Bring someone who can take you home after the test. You may not drive, take a bus or take a taxi by yourself.   No eating 8 hours before your exam. You may have clear liquids up until 4 hours before your exam. (Clear liquids include water, clear tea, black coffee and fruit juice without pulp.)   You will spend four to five hours in the recovery room.  Please call the Imaging Department at your exam site with any questions.            Jul 13, 2018  6:00 PM CDT   Return Walk In Ortho with Mukesh Lantigua, DO   [facility] ([facility])    [address] [address] [phone]              Future tests that were ordered for you today     Open Future Orders        Priority Expected Expires Ordered    MRI Lumbar spine w/o contrast Routine  7/10/2019 7/10/2018            Who to contact     Please call your clinic at [phone] to:    Ask questions about your health    Make or cancel appointments    Discuss your medicines    Learn about your test results    Speak to your doctor            Additional Information About Your Visit        Care EveryWhere ID     This is your Care EveryWhere ID. This could be used by other organizations to access your Oral medical records  [id number]        Your Vitals Were     Height BMI  "(Body Mass Index)                1.626 m (5' 4\") 19.57 kg/m2           Blood Pressure from Last 3 Encounters:   07/10/18 111/69   05/15/18 121/79   03/14/17 117/58    Weight from Last 3 Encounters:   07/10/18 51.7 kg (114 lb)   05/15/18 51.7 kg (114 lb)   12/12/16 52.3 kg (115 lb 6.4 oz)                 Today's Medication Changes          These changes are accurate as of 7/10/18 12:54 PM.  If you have any questions, ask your nurse or doctor.               Start taking these medicines.        Dose/Directions    methylPREDNISolone 4 MG tablet   Commonly known as:  MEDROL DOSEPAK   Used for:  Lumbar back pain with radiculopathy affecting left lower extremity, Sacroiliac joint pain        Follow package instructions   Quantity:  21 tablet   Refills:  0            Where to get your medicines      These medications were sent to [facility] [address] [place] [address] & [address] [address]     Phone:  [phone]     methylPREDNISolone 4 MG tablet                Primary Care Provider Office Phone # Fax #    Essence Tamayo [phone]       [address] [address]        Equal Access to Services     WADE COVINGTON AH: Sarah lechugao Soflavia, waaxda luqadaha, qaybta kaalmada adeegyada, eloy crane hayluh borjas. So [facility] [phone].    ATENCIÓN: Si habla español, tiene a farris disposición servicios gratuitos de asistencia lingüística. Wong al [phone].    We comply with applicable federal civil rights laws and [place] laws. We do not discriminate on the basis of race, color, national origin, age, disability, sex, sexual orientation, or gender identity.            Thank you!     Thank you for choosing [facility]  for your care. Our goal is always to provide you with excellent care. Hearing back from our patients is one way " we can continue to improve our services. Please take a few minutes to complete the written survey that you may receive in the mail after your visit with us. Thank you!             Your Updated Medication List - Protect others around you: Learn how to safely use, store and throw away your medicines at www.disposemymeds.org.          This list is accurate as of 7/10/18 12:54 PM.  Always use your most recent med list.                   Brand Name Dispense Instructions for use Diagnosis    bromfenac 0.09 % ophthalmic solution    XIBROM     Place 1 drop Into the left eye 2 times daily        Calcium carb-Vitamin D 500 mg Agdaagux-200 units 500-200 MG-UNIT per tablet    OSCAL with D;Oyster Shell Calcium     Take 2 tablets by mouth daily        CELEBREX PO      Take 200 mg by mouth 2 times daily        EPIPEN 0.3 MG/0.3ML injection   Generic drug:  EPINEPHrine      Inject 0.3 mg into the muscle once as needed        ESTRADIOL PO      Take 0.5 mg by mouth daily        folic acid 1 MG tablet    FOLVITE     Take 3 mg by mouth daily        hydrocortisone 25 MG Suppository    ANUSOL-HC    28 suppository    Place 1 suppository (25 mg) rectally 2 times daily as needed for hemorrhoids    Internal hemorrhoids       medroxyPROGESTERone 10 MG tablet    PROVERA     Take 10 mg by mouth daily One tablet (10 mg) daily for Days 1-12 of each month.        methotrexate 2.5 MG tablet CHEMO      Take 15 mg by mouth once a week Weekly on Tuesdays        methylPREDNISolone 4 MG tablet    MEDROL DOSEPAK    21 tablet    Follow package instructions    Lumbar back pain with radiculopathy affecting left lower extremity, Sacroiliac joint pain       OCUVITE PRESERVISION PO      Take 1 tablet by mouth daily        PILOCARPINE HCL PO      Take 5 mg by mouth 3 times daily        ranitidine 150 MG tablet    ZANTAC     Take 1 tablet (150 mg) by mouth 2 times daily    Gastrointestinal hemorrhage, unspecified gastrointestinal hemorrhage type       tretinoin 0.1  % cream    RETIN-A     Apply topically At Bedtime           (1) repeated attempts, holds nipple in mouth, stimulate to suck

## 2021-07-27 NOTE — DISCHARGE NOTE OB - MATERIALS PROVIDED
Vaccinations/[facility]  Screening Program/  Immunization Record/Breastfeeding Log/Bottle Feeding Log/Breastfeeding Mother’s Support Group Information/Guide to Postpartum Care/[facility] Hearing Screen Program/Back To Sleep Handout/Shaken Baby Prevention Handout/Breastfeeding Guide and Packet/Birth Certificate Instructions/Discharge Medication Information for Patients and Families Pocket Guide/Tdap Vaccination (VIS Pub Date: 2012)

## 2021-07-27 NOTE — DISCHARGE NOTE OB - HOSPITAL COURSE
Patient had an uncomplicated repeat  section; met all postpartum mile stones; plan for postpartum IUD placement;

## 2021-07-27 NOTE — DISCHARGE NOTE OB - CARE PLAN
Principal Discharge DX:	 delivery delivered  Goal:	full recovery  Assessment and plan of treatment:	follow up postpartum for incision check and diabetes testing in 2 weeks

## 2021-07-27 NOTE — PROGRESS NOTE ADULT - ATTENDING COMMENTS
Pt seen and examined and agree with above Pt seen and examined and agree with above  Continue current management

## 2021-07-27 NOTE — DISCHARGE NOTE OB - MEDICATION SUMMARY - MEDICATIONS TO TAKE
I will START or STAY ON the medications listed below when I get home from the hospital:    Tylenol 325 mg oral tablet  -- 3 tab(s) by mouth   -- Indication: For  delivery delivered    ibuprofen 600 mg oral tablet  -- 1 tab(s) by mouth every 6 hours  -- Indication: For  delivery delivered

## 2021-07-27 NOTE — DISCHARGE NOTE OB - COMMUNITY RESOURCE CONTACT NUMBER:
Patient to call and  schedule baby's first-visit appointment with  preferred pediatrician  Dr. Nae Kumar MD  [address] [place], [place] [address] [phone] so that baby is evaluated by pediatrician 1 to 2 days after hospital discharge.

## 2021-07-27 NOTE — DISCHARGE NOTE OB - PATIENT PORTAL LINK FT
You can access the FollowMyHealth Patient Portal offered by [facility] by registering at the following website: [email or web address]. By joining [employer]’s FollowMyHealth portal, you will also be able to view your health information using other applications (apps) compatible with our system.

## 2021-07-27 NOTE — DISCHARGE NOTE OB - COMMUNITY RESOURCE NAME:
Patient will call to schedule an appointment for   Incision check for 2 weeks after delivery date at [facility]  [phone].

## 2021-07-27 NOTE — LACTATION INITIAL EVALUATION - LACTATION INTERVENTIONS
initiate/review safe skin-to-skin/initiate/review hand expression/initiate/review techniques for position and latch/review techniques to increase milk supply/initiate/review breast massage/compression/reviewed components of an effective feeding and at least 8 effective feedings per day required/reviewed importance of monitoring infant diapers, the breastfeeding log, and minimum output each day/reviewed risks of unnecessary formula supplementation/reviewed strategies to transition to breastfeeding only/reviewed benefits and recommendations for rooming in/reviewed feeding on demand/by cue at least 8 times a day/recommended follow-up with pediatrician within 24 hours of discharge/reviewed indications of inadequate milk transfer that would require supplementation

## 2021-07-27 NOTE — DISCHARGE NOTE OB - MEDICATION SUMMARY - MEDICATIONS TO STOP TAKING
I will STOP taking the medications listed below when I get home from the hospital:    HumuLIN N KwikPen 100 units/mL subcutaneous suspension  -- takes injection 55 units HS    prenatal vitamin one tablet orally daily - last dose 7-19-21   I will STOP taking the medications listed below when I get home from the hospital:  None

## 2021-07-27 NOTE — DISCHARGE NOTE OB - HOME CARE AGENCY NAME:
Calcipotriene Pregnancy And Lactation Text: This medication has not been proven safe during pregnancy. It is unknown if this medication is excreted in breast milk. [facility]

## 2021-07-28 ENCOUNTER — NON-APPOINTMENT (OUTPATIENT)
Age: 31
End: 2021-07-28

## 2021-07-28 VITALS
OXYGEN SATURATION: 100 % | DIASTOLIC BLOOD PRESSURE: 57 MMHG | RESPIRATION RATE: 18 BRPM | HEART RATE: 101 BPM | TEMPERATURE: 98 F | SYSTOLIC BLOOD PRESSURE: 110 MMHG

## 2021-07-28 RX ORDER — IBUPROFEN 200 MG
600 TABLET ORAL EVERY 6 HOURS
Refills: 0 | Status: DISCONTINUED | OUTPATIENT
Start: 2021-07-28 | End: 2021-07-28

## 2021-07-28 RX ORDER — ACETAMINOPHEN 500 MG
3 TABLET ORAL
Qty: 0 | Refills: 0 | DISCHARGE
Start: 2021-07-28

## 2021-07-28 RX ORDER — IBUPROFEN 200 MG
1 TABLET ORAL
Qty: 0 | Refills: 0 | DISCHARGE
Start: 2021-07-28

## 2021-07-28 RX ADMIN — Medication 975 MILLIGRAM(S): at 12:00

## 2021-07-28 RX ADMIN — Medication 30 MILLIGRAM(S): at 03:55

## 2021-07-28 RX ADMIN — HEPARIN SODIUM 10000 UNIT(S): 5000 INJECTION INTRAVENOUS; SUBCUTANEOUS at 06:36

## 2021-07-28 RX ADMIN — Medication 975 MILLIGRAM(S): at 11:02

## 2021-07-28 RX ADMIN — Medication 600 MILLIGRAM(S): at 16:35

## 2021-07-28 RX ADMIN — Medication 30 MILLIGRAM(S): at 03:13

## 2021-07-28 RX ADMIN — HEPARIN SODIUM 10000 UNIT(S): 5000 INJECTION INTRAVENOUS; SUBCUTANEOUS at 18:29

## 2021-07-28 RX ADMIN — Medication 600 MILLIGRAM(S): at 15:35

## 2021-07-28 NOTE — PROGRESS NOTE ADULT - ASSESSMENT
31y/o  POD#2 from repeat low transverse  section in pregnancy complicated by GDMA2. Postop H/H stable at 10.4/32.8 from 11.2/34.4. Patient is currently in stable condition.
31y/o  POD#1 from repeat low transverse  section. H/H today 10.4/32.8 from 11.2/34.4. Patient currently in stable condition.

## 2021-07-28 NOTE — PROGRESS NOTE ADULT - PROBLEM SELECTOR PLAN 1
- Continue with pain management  - Increase ambulation  - Continue regular diet  - Check CBC  - Incision dressing removed POD1, patient encouraged to keep site clean and dry    Karina Currie  PGY-1
- Continue with pain management  - Increase ambulation  - Continue regular diet    Karina Currie  PGY-1

## 2021-07-28 NOTE — PROGRESS NOTE ADULT - SUBJECTIVE AND OBJECTIVE BOX
Day ___1 of Anesthesia Pain Management Service    SUBJECTIVE:  Pain Scale Score	At rest: __none_ 	With Activity: __mild_ 	[ x] Refer to charted pain scores    THERAPY:    s/p _____0.1__ mg PF morphine     OBJECTIVE:    Sedation Score:	[ x] Alert	[ ] Drowsy	[ ] Arousable	[ ] Asleep	[ ] Unresponsive    Side Effects:	[x ] None	[ ] Nausea	[ ] Vomiting	[ ] Pruritus  		  [ ] Weakness		[ ] Numbness	[ ] Other:    Vital Signs Last 24 Hrs  T(C): 36.7 (28 Jul 2021 05:59), Max: 37.3 (27 Jul 2021 13:41)  T(F): 98 (28 Jul 2021 05:59), Max: 99.1 (27 Jul 2021 13:41)  HR: 102 (28 Jul 2021 05:59) (99 - 106)  BP: 118/67 (28 Jul 2021 05:59) (118/67 - 142/84)  BP(mean): --  RR: 18 (28 Jul 2021 05:59) (18 - 19)  SpO2: 100% (28 Jul 2021 05:59) (98% - 100%)    ASSESSMENT/ PLAN  [x ] Patient transitioned to prn analgesics  [ x] Pain management per primary service, pain service to sign off   [ x]Documentation and Verification of current medications     Comments: No anesthetic complications.
Patient seen and examined at bedside, no acute overnight events. No acute complaints, pain well controlled. Armando still in place. Patient has not been able to eat since the surgery, not passing flatus. Denies CP, SOB, N/V, HA, blurred vision, epigastric pain.    Vital Signs Last 24 Hours  T(C): 36.8 (07-27-21 @ 05:46), Max: 36.8 (07-26-21 @ 16:34)  HR: 89 (07-27-21 @ 05:46) (89 - 119)  BP: 118/77 (07-27-21 @ 05:46) (91/56 - 146/79)  RR: 18 (07-27-21 @ 05:46) (15 - 22)  SpO2: 98% (07-27-21 @ 05:46) (92% - 100%)    I&O's Summary    26 Jul 2021 07:01  -  27 Jul 2021 07:00  --------------------------------------------------------  IN: 3650 mL / OUT: 654 mL / NET: 2996 mL        Physical Exam:  General: NAD  Abdomen: Soft, non-tender, non-distended, fundus firm  Incision: Pfannenstiel incision CDI, subcuticular suture closure, clean and dry  Pelvic: Lochia wnl    Labs:    Blood Type: -- --  Antibody Screen: Negative  RPR: Negative               10.4   14.05 )-----------( 260      ( 07-27 @ 06:39 )             32.8                11.2   8.48  )-----------( 259      ( 07-26 @ 16:20 )             34.4                11.2   9.72  )-----------( 282      ( 07-19 @ 20:49 )             35.7         MEDICATIONS  (STANDING):  acetaminophen   Tablet .. 975 milliGRAM(s) Oral <User Schedule>  diphtheria/tetanus/pertussis (acellular) Vaccine (ADAcel) 0.5 milliLiter(s) IntraMuscular once  heparin   Injectable 24058 Unit(s) SubCutaneous every 12 hours  ibuprofen  Tablet. 600 milliGRAM(s) Oral every 6 hours  ketorolac   Injectable 30 milliGRAM(s) IV Push every 6 hours  lactated ringers. 1000 milliLiter(s) (75 mL/Hr) IV Continuous <Continuous>  oxytocin Infusion 333.333 milliUNIT(s)/Min (1000 mL/Hr) IV Continuous <Continuous>    MEDICATIONS  (PRN):  diphenhydrAMINE 25 milliGRAM(s) Oral every 6 hours PRN Pruritus  lanolin Ointment 1 Application(s) Topical every 6 hours PRN Sore Nipples  magnesium hydroxide Suspension 30 milliLiter(s) Oral two times a day PRN Constipation  oxyCODONE    IR 5 milliGRAM(s) Oral every 3 hours PRN Moderate to Severe Pain (4-10)  oxyCODONE    IR 5 milliGRAM(s) Oral once PRN Moderate to Severe Pain (4-10)  simethicone 80 milliGRAM(s) Chew every 4 hours PRN Gas  
Patient seen and examined at bedside, no acute overnight events. No acute complaints, states that pain is well controlled on oral pain medications. Patient is ambulating and tolerating regular diet. Passing flatus, having bowel movements, voiding spontaneously on own. Denies CP, SOB, N/V, HA, dizziness upon standing, saturating through pads.    Vital Signs Last 24 Hours  T(C): 36.7 (07-28-21 @ 05:59), Max: 37.3 (07-27-21 @ 13:41)  HR: 102 (07-28-21 @ 05:59) (99 - 106)  BP: 118/67 (07-28-21 @ 05:59) (118/67 - 142/84)  RR: 18 (07-28-21 @ 05:59) (18 - 19)  SpO2: 100% (07-28-21 @ 05:59) (98% - 100%)    I&O's Summary    27 Jul 2021 07:01  -  28 Jul 2021 07:00  --------------------------------------------------------  IN: 0 mL / OUT: 2250 mL / NET: -2250 mL        Physical Exam:  General: NAD  Abdomen: Soft, non-tender, non-distended, fundus firm  Incision: Pfannenstiel incision CDI, subcuticular suture closure  Pelvic: Lochia wnl    Labs:    Blood Type: -- --  Antibody Screen: Negative  RPR: Negative               10.4   14.05 )-----------( 260      ( 07-27 @ 06:39 )             32.8                11.2   8.48  )-----------( 259      ( 07-26 @ 16:20 )             34.4                11.2   9.72  )-----------( 282      ( 07-19 @ 20:49 )             35.7         MEDICATIONS  (STANDING):  acetaminophen   Tablet .. 975 milliGRAM(s) Oral <User Schedule>  diphtheria/tetanus/pertussis (acellular) Vaccine (ADAcel) 0.5 milliLiter(s) IntraMuscular once  heparin   Injectable 99775 Unit(s) SubCutaneous every 12 hours  ibuprofen  Tablet. 600 milliGRAM(s) Oral every 6 hours  lactated ringers. 1000 milliLiter(s) (75 mL/Hr) IV Continuous <Continuous>  oxytocin Infusion 333.333 milliUNIT(s)/Min (1000 mL/Hr) IV Continuous <Continuous>    MEDICATIONS  (PRN):  diphenhydrAMINE 25 milliGRAM(s) Oral every 6 hours PRN Pruritus  lanolin Ointment 1 Application(s) Topical every 6 hours PRN Sore Nipples  magnesium hydroxide Suspension 30 milliLiter(s) Oral two times a day PRN Constipation  oxyCODONE    IR 5 milliGRAM(s) Oral every 3 hours PRN Moderate to Severe Pain (4-10)  oxyCODONE    IR 5 milliGRAM(s) Oral once PRN Moderate to Severe Pain (4-10)  simethicone 80 milliGRAM(s) Chew every 4 hours PRN Gas  
Postop Day  __1_ s/p   C- Section    THERAPY:  [ x ] Spinal morphine     [  ] Epidural morphine   [  ] IV PCA Hydromorphone 1 mg/ml      Sedation Score:	  [ x ] Alert	    [  ] Drowsy        [  ] Arousable	[  ] Asleep	[  ] Unresponsive    Side Effects:	  [ x ] None	     [  ] Nausea        [  ] Pruritus        [  ] Weakness   [  ] Numbness        ASSESSMENT/ PLAN   [   ] Discontinue         [  ] Continue  [ x ]Documentation and Verification of current medications     Comments: Transitioned to prn oral analgesics by primary team. No anesthetic complication.

## 2021-08-02 ENCOUNTER — APPOINTMENT (OUTPATIENT)
Dept: ANTEPARTUM | Facility: CLINIC | Age: 31
End: 2021-08-02

## 2021-08-09 ENCOUNTER — APPOINTMENT (OUTPATIENT)
Dept: OBGYN | Facility: HOSPITAL | Age: 31
End: 2021-08-09

## 2021-08-09 ENCOUNTER — OUTPATIENT (OUTPATIENT)
Dept: OUTPATIENT SERVICES | Facility: HOSPITAL | Age: 31
LOS: 1 days | End: 2021-08-09

## 2021-08-09 VITALS
TEMPERATURE: 97.6 F | HEART RATE: 89 BPM | HEIGHT: 63 IN | BODY MASS INDEX: 41.99 KG/M2 | DIASTOLIC BLOOD PRESSURE: 92 MMHG | SYSTOLIC BLOOD PRESSURE: 125 MMHG | WEIGHT: 237 LBS

## 2021-08-09 VITALS — DIASTOLIC BLOOD PRESSURE: 78 MMHG | SYSTOLIC BLOOD PRESSURE: 126 MMHG

## 2021-08-09 DIAGNOSIS — R58 HEMORRHAGE, NOT ELSEWHERE CLASSIFIED: Chronic | ICD-10-CM

## 2021-08-09 DIAGNOSIS — Z34.90 ENCOUNTER FOR SUPERVISION OF NORMAL PREGNANCY, UNSPECIFIED, UNSPECIFIED TRIMESTER: Chronic | ICD-10-CM

## 2021-08-09 DIAGNOSIS — R22.9 LOCALIZED SWELLING, MASS AND LUMP, UNSPECIFIED: Chronic | ICD-10-CM

## 2021-08-09 DIAGNOSIS — O12.10 GESTATIONAL PROTEINURIA, UNSPECIFIED TRIMESTER: ICD-10-CM

## 2021-08-09 NOTE — HISTORY OF PRESENT ILLNESS
[Postpartum Follow Up] : postpartum follow up [Complications:___] : complications include: [unfilled] [Gestational Diabetes] : gestational diabetes [Insulin] : treated with insulin [ Section] : baby was delivered via  section [Delivery Date: ___] : on [unfilled] [Repeat C/S] : delivered by  section (repeat) [Male] : Delivery History: baby boy [Wt. ___] : weighing [unfilled] [Breastfeeding] : currently nursing [Discharge HCT: ___] : hematocrit level was [unfilled] [Discharge HGB: ___] : hemoglobin level was [unfilled] [Intended Contraception] : Intended Contraception: [IUD] : intrauterine device [None] : The patient is currently asymptomatic [Healed] : healed [Doing Well] : is doing well [Rhogam] : Rhogam was not administered [Rubella Vaccine] : Rubella vaccine was not administered [Pertussis Vaccine] : Pertussis vaccine was not administered [BTL] : no tubal ligation [Resumed Menses] : has not resumed her menses [Resumed Escalante] : has not resumed intercourse [de-identified] : Patient feeling well.  Baby is doing well.  Initial blood pressure was 125/92 but after patient sat for a bit BP was repeated - 126/78.  Patient reports that she has a blood pressure cuff at home and she will check daily and let us know if elevations.  She denies any signs of PEC.  Wants IUD for contraception [de-identified] : Monitor BP daily and call with any >140/90.  Social work in to see patient.  Signs of PEC discussed.  RTC 4 weeks for full pp visit.  Will schedule IUD appt. at that time.

## 2021-08-11 DIAGNOSIS — Z98.891 HISTORY OF UTERINE SCAR FROM PREVIOUS SURGERY: ICD-10-CM

## 2021-08-12 ENCOUNTER — APPOINTMENT (OUTPATIENT)
Dept: OBGYN | Facility: HOSPITAL | Age: 31
End: 2021-08-12

## 2021-08-24 LAB — SURGICAL PATHOLOGY STUDY: SIGNIFICANT CHANGE UP

## 2021-08-25 LAB — GLUCOSE BLDC GLUCOMTR-MCNC: 120

## 2021-09-10 ENCOUNTER — APPOINTMENT (OUTPATIENT)
Dept: OBGYN | Facility: HOSPITAL | Age: 31
End: 2021-09-10

## 2021-09-10 ENCOUNTER — RESULT REVIEW (OUTPATIENT)
Age: 31
End: 2021-09-10

## 2021-09-10 ENCOUNTER — OUTPATIENT (OUTPATIENT)
Dept: OUTPATIENT SERVICES | Facility: HOSPITAL | Age: 31
LOS: 1 days | End: 2021-09-10

## 2021-09-10 VITALS
WEIGHT: 231 LBS | BODY MASS INDEX: 40.93 KG/M2 | TEMPERATURE: 97.8 F | SYSTOLIC BLOOD PRESSURE: 136 MMHG | HEIGHT: 63 IN | HEART RATE: 84 BPM | DIASTOLIC BLOOD PRESSURE: 86 MMHG

## 2021-09-10 DIAGNOSIS — R22.9 LOCALIZED SWELLING, MASS AND LUMP, UNSPECIFIED: Chronic | ICD-10-CM

## 2021-09-10 DIAGNOSIS — Z98.891 HISTORY OF UTERINE SCAR FROM PREVIOUS SURGERY: ICD-10-CM

## 2021-09-10 DIAGNOSIS — Z34.90 ENCOUNTER FOR SUPERVISION OF NORMAL PREGNANCY, UNSPECIFIED, UNSPECIFIED TRIMESTER: Chronic | ICD-10-CM

## 2021-09-10 DIAGNOSIS — R58 HEMORRHAGE, NOT ELSEWHERE CLASSIFIED: Chronic | ICD-10-CM

## 2021-09-10 LAB
GLUCOSE 2H P GLC SERPL-MCNC: 105 MG/DL — SIGNIFICANT CHANGE UP (ref 70–139)
GLUCOSE P FAST BLDV-MCNC: 104 MG/DL — HIGH (ref 70–99)

## 2021-09-10 NOTE — HISTORY OF PRESENT ILLNESS
[Postpartum Follow Up] : postpartum follow up [Complications:___] : complications include: [unfilled] [Delivery Date: ___] : on [unfilled] [Repeat C/S] : delivered by  section (repeat) [Male] : Delivery History: baby boy [Wt. ___] : weighing [unfilled] [Clean/Dry/Intact] : clean, dry and intact [Healed] : healed [Back to Normal] : is back to normal in size [Normal] : the vagina was normal [Examination Of The Breasts] : breasts are normal [None] : None [Breastfeeding] : not currently nursing [Cervix Sample Taken] : cervical sample not taken for a Pap smear [de-identified] : PP 6 weeks, bottle feeding, s/p preeclampsia and GDMA, BP's all <140/90, baby admittted last night with pyloric stenosis [de-identified] : 2 hour GTT today, Continue home BP monitoring, diet and exercise, birth control options discussed, desires IUD, RTC 1-2 weeks for IUD insertion.

## 2021-09-15 DIAGNOSIS — O34.211 MATERNAL CARE FOR LOW TRANSVERSE SCAR FROM PREVIOUS CESAREAN DELIVERY: ICD-10-CM

## 2021-09-15 DIAGNOSIS — O99.213 OBESITY COMPLICATING PREGNANCY, THIRD TRIMESTER: ICD-10-CM

## 2021-09-15 DIAGNOSIS — Z3A.37 37 WEEKS GESTATION OF PREGNANCY: ICD-10-CM

## 2021-09-15 DIAGNOSIS — O24.414 GESTATIONAL DIABETES MELLITUS IN PREGNANCY, INSULIN CONTROLLED: ICD-10-CM

## 2021-09-16 DIAGNOSIS — Z98.891 HISTORY OF UTERINE SCAR FROM PREVIOUS SURGERY: ICD-10-CM

## 2021-12-22 NOTE — OB PROVIDER DELIVERY SUMMARY - NSVAGINALEXAMCERT_OBGYN_ALL_OB
POSITIVE The Delivery OB Provider certifies that vaginal examination and/or abdominal examination after the delivery was done and no foreign body was found.

## 2022-11-28 ENCOUNTER — EMERGENCY (EMERGENCY)
Facility: HOSPITAL | Age: 32
LOS: 1 days | Discharge: HOME | End: 2022-11-28
Admitting: EMERGENCY MEDICINE

## 2022-11-28 VITALS
SYSTOLIC BLOOD PRESSURE: 145 MMHG | OXYGEN SATURATION: 98 % | RESPIRATION RATE: 18 BRPM | HEART RATE: 85 BPM | TEMPERATURE: 98 F | DIASTOLIC BLOOD PRESSURE: 65 MMHG

## 2022-11-28 DIAGNOSIS — R58 HEMORRHAGE, NOT ELSEWHERE CLASSIFIED: Chronic | ICD-10-CM

## 2022-11-28 DIAGNOSIS — R22.9 LOCALIZED SWELLING, MASS AND LUMP, UNSPECIFIED: Chronic | ICD-10-CM

## 2022-11-28 DIAGNOSIS — Z34.90 ENCOUNTER FOR SUPERVISION OF NORMAL PREGNANCY, UNSPECIFIED, UNSPECIFIED TRIMESTER: Chronic | ICD-10-CM

## 2022-11-28 PROCEDURE — 99284 EMERGENCY DEPT VISIT MOD MDM: CPT

## 2022-11-28 RX ORDER — ACETAMINOPHEN 500 MG
650 TABLET ORAL ONCE
Refills: 0 | Status: COMPLETED | OUTPATIENT
Start: 2022-11-28 | End: 2022-11-28

## 2022-11-28 RX ORDER — CYCLOBENZAPRINE HYDROCHLORIDE 10 MG/1
1 TABLET, FILM COATED ORAL
Qty: 21 | Refills: 0
Start: 2022-11-28 | End: 2022-12-04

## 2022-11-28 RX ORDER — IBUPROFEN 200 MG
600 TABLET ORAL ONCE
Refills: 0 | Status: COMPLETED | OUTPATIENT
Start: 2022-11-28 | End: 2022-11-28

## 2022-11-28 RX ADMIN — Medication 650 MILLIGRAM(S): at 13:11

## 2022-11-28 RX ADMIN — Medication 600 MILLIGRAM(S): at 13:17

## 2022-11-28 NOTE — ED PROVIDER NOTE - PATIENT PORTAL LINK FT
You can access the FollowMyHealth Patient Portal offered by [facility] by registering at the following website: [email or web address]. By joining [employer]’s FollowMyHealth portal, you will also be able to view your health information using other applications (apps) compatible with our system.

## 2022-11-28 NOTE — ED PROVIDER NOTE - CLINICAL SUMMARY MEDICAL DECISION MAKING FREE TEXT BOX
32-year-old female with no stated past medical history presenting with 2 weeks of right-sided buttock/upper thigh pain. Associated with tingling/numbness to right 4th-5th toes. On exam pt is well appearing, afebrile, no midline spinal tenderness, no red flags for low back pain. Strength 5/5 in b/l LE, ambulatory without gait disturbance. Concern for likely sciatica, MSK type pain, feels like she has a pebble in her foot/possible neuroma? Plan: NSAIDs, muscle relaxant, outpatient spine center and podiatry.

## 2022-11-28 NOTE — ED PROVIDER NOTE - NSICDXFAMILYHX_GEN_ALL_CORE_FT
FAMILY HISTORY:  Father  Still living? Yes, Estimated age: Age Unknown  Family history of epilepsy in father, Age at diagnosis: Age Unknown  Family history of hypertension, Age at diagnosis: Age Unknown

## 2022-11-28 NOTE — ED PROVIDER NOTE - NSICDXPASTMEDICALHX_GEN_ALL_CORE_FT
PAST MEDICAL HISTORY:  Gestational diabetes on insulin for pregnancy in May 2019 and in July 2021    Miscarriage 2016    Pregnancy this pregnancy with LEIGHTON 5-12-19

## 2022-11-28 NOTE — ED PROVIDER NOTE - NSFOLLOWUPINSTRUCTIONS_ED_ALL_ED_FT
Follow-up with your primary care doctor within 1 week.    Follow-up with the spine center within 1 week, referral is attached please call office to make an appointment.    Follow-up with the podiatrist within 1 week, referral list attached please call to make an appointment.  Take ibuprofen 600 mg every 8 hours as needed for pain, take with food.    You can also take Tylenol 650 mg every 6 hours as needed for pain.    Take cyclobenzaprine 5 mg every 8 hours as needed for muscle spasm, caution drowsiness do not drive or drink alcohol while taking.    Return to the ER with any worsening or concerning symptoms weakness, difficulty walking, bowel or bladder incontinence, worsening numbness or any other concerns.

## 2022-11-28 NOTE — ED PROVIDER NOTE - OBJECTIVE STATEMENT
32-year-old female with no stated past medical history presenting with 2 weeks of right-sided buttock/upper thigh pain.  Patient states for the past 2 weeks she has pain on the right lateral side of her hip/upper thigh.  Pain described as "muscle contraction"/ tightness. Patient states 1 week ago she noticed tingling/numbness to her right pinky toe and fourth toe.  Patient has been taking Aleve at home which does provide good relief of the pain.  Patient reports intermittent low back pain for the past few years since having an epidural.  Patient denies weakness, bowel or bladder incontinence, saddle anesthesia, difficulty walking, chest pain, shortness of breath, leg swelling, calf pain, recent travel or illness, headache, dizziness or any other concerns.

## 2022-11-28 NOTE — ED PROVIDER NOTE - NSICDXPASTSURGICALHX_GEN_ALL_CORE_FT
Wheelchair/Stroller
PAST SURGICAL HISTORY:  Bleeding D&C "long time ago"     delivery delivered May 2019, full term baby boy  (done due to not dilating), weight 8 pounds 6 ounces    Mass excision of cyst from left foot    Pregnancy scheduled for  and b/l tubal ligation on 21

## 2022-11-28 NOTE — ED PROVIDER NOTE - PHYSICAL EXAMINATION
MSK: no midline spinal tenderness  subjective decreased sensation to right 5th and 4th toes, FROM all extremities/digits, NV intact, strength 5/5 in dorsiflexion and plantarflexion  pt is ambulatory without difficulty

## 2022-11-28 NOTE — ED ADULT TRIAGE NOTE - CHIEF COMPLAINT QUOTE
p/t c/o of rt lower back pain radiating to RT lower extremity for 2 weeks, p/t ambulatory, denies any trauma

## 2022-12-07 ENCOUNTER — APPOINTMENT (OUTPATIENT)
Dept: ORTHOPEDIC SURGERY | Facility: CLINIC | Age: 32
End: 2022-12-07

## 2022-12-13 ENCOUNTER — APPOINTMENT (OUTPATIENT)
Dept: ORTHOPEDIC SURGERY | Facility: CLINIC | Age: 32
End: 2022-12-13

## 2022-12-13 ENCOUNTER — NON-APPOINTMENT (OUTPATIENT)
Age: 32
End: 2022-12-13

## 2022-12-13 VITALS
SYSTOLIC BLOOD PRESSURE: 125 MMHG | HEART RATE: 90 BPM | OXYGEN SATURATION: 98 % | HEIGHT: 63 IN | BODY MASS INDEX: 42.52 KG/M2 | DIASTOLIC BLOOD PRESSURE: 83 MMHG | WEIGHT: 240 LBS

## 2022-12-13 DIAGNOSIS — M54.9 DORSALGIA, UNSPECIFIED: ICD-10-CM

## 2022-12-13 PROCEDURE — 99204 OFFICE O/P NEW MOD 45 MIN: CPT

## 2022-12-13 PROCEDURE — 72110 X-RAY EXAM L-2 SPINE 4/>VWS: CPT

## 2022-12-13 RX ORDER — TIZANIDINE 2 MG/1
2 TABLET ORAL EVERY 6 HOURS
Qty: 56 | Refills: 0 | Status: ACTIVE | COMMUNITY
Start: 2022-12-13 | End: 1900-01-01

## 2022-12-13 NOTE — ASSESSMENT
[FreeTextEntry1] : I had a lengthy discussion with the patient in regards to their treatment plan and diagnosis.  They do have objective weakness findings on my exam.  Their symptoms have persisted despite the conservative management they have attempted thus far.  As a result I would like to proceed with a lumbar MRI.  In tandem with this they should begin physical therapy/home therapy program.  The patient can take Tylenol/NSAIDs as needed for pain control if medically able to.  I will have the patient follow-up in 3 to 4 weeks for repeat clinical evaluation.  I encouraged them to follow-up sooner if their symptoms worsen or change in any way.\par \par The patient has tried the following treatments:\par Activity modification          +\par Ice/Compression                  +\par Braces                                     -\par NSAIDS                                   +\par Physical Therapy                   +\par \par Please note the above modalities been tried for over 6+ weeks over the past 2 months

## 2022-12-13 NOTE — HISTORY OF PRESENT ILLNESS
[de-identified] : This is a 32-year-old female here today for evaluation of her low back and neck symptoms.  She does have a history of foot her right lateral thigh, right lateral calf.  The symptoms have been disabling at times.  It did cause her to go to the emergency room when it was severe.  She denies any bowel bladder issues.  She denies any saddle anesthesia.  She has been saying the symptoms been off and on for 3+ years.

## 2022-12-13 NOTE — PHYSICAL EXAM
[de-identified] : Lumbar Physical Exam\par \par Gait - Normal\par \par Station - Normal\par \par Sagittal balance - Normal\par \par Compensatory mechanism? - None\par \par Difficulty with heel and toe walk\par Reflexes\par Patellar - normal\par Gastroc - normal\par Clonus - No\par \par Hip Exam - Normal\par \par Straight leg raise - none\par \par Pulses - 2+ dp/pt\par \par Range of motion - normal\par \par Sensation \par Sensation intact to light touch in L1, L2, L3, L4, L5 and S1 dermatomes bilaterally\par \par Motor\par 	IP	Quad	HS	TA	Gastroc	EHL\par Right	3+/5	5/5	5/5	4/5	5/5	5/5\par Left	5/5	5/5	5/5	5/5	5/5	5/5 [de-identified] : Lumbar radiographs\par Mild facet arthropathy\par Disc heights relatively well-maintained\par

## 2023-01-09 NOTE — OB PST NOTE - LAST ECHOCARDIOGRAM
denies Glycopyrrolate Pregnancy And Lactation Text: This medication is Pregnancy Category B and is considered safe during pregnancy. It is unknown if it is excreted breast milk.

## 2023-06-02 NOTE — ED ADULT NURSE NOTE - PAIN RATING/NUMBER SCALE (0-10): REST
Called pt and she stated she didn't actually get a bill just like an invoice right now. I advised her to wait and see if she gets an actually bill and bring that by. She expressed understanding.    2

## 2023-07-15 NOTE — OB PST NOTE - NSANTHAGERD_ENT_A_CORE
- compensated.     Echo 7/22   The left ventricle is normal in size with severe concentric hypertrophy and normal systolic function.   The estimated ejection fraction is 55%.   Grade II left ventricular diastolic dysfunction.   Normal right ventricular size with normal right ventricular systolic function.   Severe left atrial enlargement.   Mild tricuspid regurgitation.   Moderate-to-severe mitral regurgitation.   Intermediate central venous pressure (8 mmHg).   The estimated PA systolic pressure is 40 mmHg.       No

## 2024-09-11 ENCOUNTER — EMERGENCY (EMERGENCY)
Facility: HOSPITAL | Age: 34
LOS: 1 days | Discharge: HOME | End: 2024-09-11
Attending: EMERGENCY MEDICINE | Admitting: EMERGENCY MEDICINE
Payer: MEDICAID

## 2024-09-11 VITALS
OXYGEN SATURATION: 98 % | HEART RATE: 94 BPM | TEMPERATURE: 99 F | RESPIRATION RATE: 18 BRPM | DIASTOLIC BLOOD PRESSURE: 85 MMHG | HEIGHT: 62 IN | WEIGHT: 235.89 LBS | SYSTOLIC BLOOD PRESSURE: 128 MMHG

## 2024-09-11 DIAGNOSIS — R22.9 LOCALIZED SWELLING, MASS AND LUMP, UNSPECIFIED: Chronic | ICD-10-CM

## 2024-09-11 DIAGNOSIS — Z34.90 ENCOUNTER FOR SUPERVISION OF NORMAL PREGNANCY, UNSPECIFIED, UNSPECIFIED TRIMESTER: Chronic | ICD-10-CM

## 2024-09-11 DIAGNOSIS — R58 HEMORRHAGE, NOT ELSEWHERE CLASSIFIED: Chronic | ICD-10-CM

## 2024-09-11 PROCEDURE — 93010 ELECTROCARDIOGRAM REPORT: CPT

## 2024-09-11 PROCEDURE — 71046 X-RAY EXAM CHEST 2 VIEWS: CPT | Mod: 26

## 2024-09-11 PROCEDURE — 99284 EMERGENCY DEPT VISIT MOD MDM: CPT
